# Patient Record
Sex: FEMALE | Race: BLACK OR AFRICAN AMERICAN | Employment: UNEMPLOYED | URBAN - METROPOLITAN AREA
[De-identification: names, ages, dates, MRNs, and addresses within clinical notes are randomized per-mention and may not be internally consistent; named-entity substitution may affect disease eponyms.]

---

## 2018-11-20 ENCOUNTER — HOSPITAL ENCOUNTER (EMERGENCY)
Age: 28
Discharge: HOME | End: 2018-11-20
Attending: EMERGENCY MEDICINE
Payer: COMMERCIAL

## 2018-11-20 VITALS
BODY MASS INDEX: 51.53 KG/M2 | HEART RATE: 84 BPM | SYSTOLIC BLOOD PRESSURE: 118 MMHG | WEIGHT: 280 LBS | TEMPERATURE: 98.2 F | OXYGEN SATURATION: 99 % | DIASTOLIC BLOOD PRESSURE: 68 MMHG | RESPIRATION RATE: 18 BRPM | HEIGHT: 62 IN

## 2018-11-20 DIAGNOSIS — J06.9 VIRAL UPPER RESPIRATORY TRACT INFECTION: ICD-10-CM

## 2018-11-20 DIAGNOSIS — N76.0 BV (BACTERIAL VAGINOSIS): Primary | ICD-10-CM

## 2018-11-20 DIAGNOSIS — B96.89 BV (BACTERIAL VAGINOSIS): Primary | ICD-10-CM

## 2018-11-20 LAB
SERVICE CMNT-IMP: NORMAL
WET PREP GENITAL: NORMAL

## 2018-11-20 PROCEDURE — 96372 THER/PROPH/DIAG INJ SC/IM: CPT | Performed by: PHYSICIAN ASSISTANT

## 2018-11-20 PROCEDURE — 99283 EMERGENCY DEPT VISIT LOW MDM: CPT | Performed by: PHYSICIAN ASSISTANT

## 2018-11-20 PROCEDURE — 74011250636 HC RX REV CODE- 250/636: Performed by: PHYSICIAN ASSISTANT

## 2018-11-20 PROCEDURE — 87491 CHLMYD TRACH DNA AMP PROBE: CPT

## 2018-11-20 PROCEDURE — 74011250637 HC RX REV CODE- 250/637: Performed by: PHYSICIAN ASSISTANT

## 2018-11-20 PROCEDURE — 87210 SMEAR WET MOUNT SALINE/INK: CPT

## 2018-11-20 RX ORDER — METRONIDAZOLE 500 MG/1
2000 TABLET ORAL
Qty: 4 TAB | Refills: 0 | Status: SHIPPED | OUTPATIENT
Start: 2018-11-20 | End: 2018-11-20

## 2018-11-20 RX ORDER — ONDANSETRON 8 MG/1
8 TABLET, ORALLY DISINTEGRATING ORAL
Status: COMPLETED | OUTPATIENT
Start: 2018-11-20 | End: 2018-11-20

## 2018-11-20 RX ORDER — AZITHROMYCIN 250 MG/1
1000 TABLET, FILM COATED ORAL
Status: COMPLETED | OUTPATIENT
Start: 2018-11-20 | End: 2018-11-20

## 2018-11-20 RX ADMIN — ONDANSETRON 8 MG: 8 TABLET, ORALLY DISINTEGRATING ORAL at 12:31

## 2018-11-20 RX ADMIN — LIDOCAINE HYDROCHLORIDE 250 MG: 10 INJECTION, SOLUTION INFILTRATION; PERINEURAL at 12:31

## 2018-11-20 RX ADMIN — AZITHROMYCIN 1000 MG: 250 TABLET, FILM COATED ORAL at 12:31

## 2018-11-20 NOTE — ED TRIAGE NOTES
Reports congestion and runny nose. States white vaginal discharge and irritation in vaginal area. History of BV.

## 2018-11-20 NOTE — ED NOTES
I have reviewed discharge instructions with the patient. The patient verbalized understanding. Patient left ED via Discharge Method: ambulatory to Home with self. Opportunity for questions and clarification provided. Patient given 1 scripts. To continue your aftercare when you leave the hospital, you may receive an automated call from our care team to check in on how you are doing. This is a free service and part of our promise to provide the best care and service to meet your aftercare needs.  If you have questions, or wish to unsubscribe from this service please call [phone]. Thank you for Choosing our [facility] Emergency Department.

## 2018-11-20 NOTE — ED PROVIDER NOTES
The history is provided by the patient. Nasal Congestion This is a new problem. The current episode started more than 2 days ago. The problem occurs constantly. The problem has not changed since onset. Pertinent negatives include no chest pain and no headaches. Nothing aggravates the symptoms. Nothing relieves the symptoms. Treatments tried: OTC MEDS. The treatment provided mild relief. Vaginal Discharge This is a new problem. The current episode started more than 2 days ago. The problem occurs constantly. The problem has not changed since onset. The discharge occurs spontaneously. The discharge was malodorous and yellow. She is not pregnant. She has not missed her period. Pertinent negatives include no anorexia, no fever, no constipation, no nausea and no dysuria. Risk factors include history of STDs. She has tried nothing for the symptoms. The treatment provided no relief. Her past medical history is significant for STD. History reviewed. No pertinent past medical history. Past Surgical History:  
Procedure Laterality Date  HX  SECTION    
 3 or more History reviewed. No pertinent family history. Social History Socioeconomic History  Marital status: SINGLE Spouse name: Not on file  Number of children: Not on file  Years of education: Not on file  Highest education level: Not on file Social Needs  Financial resource strain: Not on file  Food insecurity - worry: Not on file  Food insecurity - inability: Not on file  Transportation needs - medical: Not on file  Transportation needs - non-medical: Not on file Occupational History  Not on file Tobacco Use  Smoking status: Never Smoker Substance and Sexual Activity  Alcohol use: No  
 Drug use: No  
 Sexual activity: Not on file Other Topics Concern  Not on file Social History Narrative  Not on file ALLERGIES: Patient has no known allergies. Review of Systems Constitutional: Negative for fever. Cardiovascular: Negative for chest pain. Gastrointestinal: Negative for anorexia, constipation and nausea. Genitourinary: Positive for vaginal discharge. Negative for dysuria. Neurological: Negative for headaches. All other systems reviewed and are negative. Vitals:  
 11/20/18 1152 BP: 124/74 Pulse: 88 Resp: 18 Temp: 98.5 °F (36.9 °C) SpO2: 100% Weight: 127 kg (280 lb) Height: 5' 2\" (1.575 m) Physical Exam  
Constitutional: She is oriented to person, place, and time. She appears well-developed and well-nourished. No distress. OBESE HENT:  
Head: Normocephalic and atraumatic. NASAL CONGESTION Eyes: EOM are normal. Pupils are equal, round, and reactive to light. Neck: Normal range of motion. Neck supple. Cardiovascular: Normal rate and regular rhythm. Pulmonary/Chest: Effort normal and breath sounds normal.  
Abdominal: Soft. Bowel sounds are normal. She exhibits no mass. There is no rebound and no guarding. Genitourinary: Vaginal discharge found. Genitourinary Comments: MODERATE YELLOW DISCHARGE, NO LESIONS, NO MASSES Musculoskeletal: Normal range of motion. Neurological: She is alert and oriented to person, place, and time. Skin: Skin is warm. She is not diaphoretic. Psychiatric: She has a normal mood and affect. Nursing note and vitals reviewed. MDM Number of Diagnoses or Management Options Diagnosis management comments: WET PREP - 
STD TREATMENT GIVEN IN ER 
PT TO USE OVER THE COUNTER COLD MEDS AS NEEDED Amount and/or Complexity of Data Reviewed Clinical lab tests: ordered and reviewed Review and summarize past medical records: yes Risk of Complications, Morbidity, and/or Mortality Presenting problems: low Diagnostic procedures: low Management options: low Patient Progress Patient progress: improved Procedures

## 2018-11-20 NOTE — LETTER
[facility] [facility] EMERGENCY DEPT [address] [address] 
[phone] Work/School Note Date: 11/20/2018 To Whom It May concern: 
 
Christy was seen and treated today in the emergency room by the following provider(s): 
Attending Provider: Asad Soto MD 
Physician Assistant: VIPIN Glez. Christy may return to work on 11-21-18. Sincerely, VIPIN Soler

## 2018-11-21 LAB
C TRACH RRNA SPEC QL NAA+PROBE: NEGATIVE
N GONORRHOEA RRNA SPEC QL NAA+PROBE: NEGATIVE
SPECIMEN SOURCE: NORMAL

## 2023-01-19 ENCOUNTER — APPOINTMENT (OUTPATIENT)
Dept: CT IMAGING | Age: 33
End: 2023-01-19
Payer: COMMERCIAL

## 2023-01-19 ENCOUNTER — HOSPITAL ENCOUNTER (EMERGENCY)
Age: 33
Discharge: HOME | End: 2023-01-19
Attending: EMERGENCY MEDICINE
Payer: COMMERCIAL

## 2023-01-19 VITALS
HEIGHT: 62 IN | TEMPERATURE: 98.9 F | DIASTOLIC BLOOD PRESSURE: 85 MMHG | HEART RATE: 92 BPM | OXYGEN SATURATION: 99 % | WEIGHT: 293 LBS | RESPIRATION RATE: 20 BRPM | SYSTOLIC BLOOD PRESSURE: 145 MMHG | BODY MASS INDEX: 53.92 KG/M2

## 2023-01-19 DIAGNOSIS — N30.01 ACUTE CYSTITIS WITH HEMATURIA: ICD-10-CM

## 2023-01-19 DIAGNOSIS — R10.30 LOWER ABDOMINAL PAIN: Primary | ICD-10-CM

## 2023-01-19 LAB
ALBUMIN SERPL-MCNC: 3.9 G/DL (ref 3.5–5)
ALBUMIN/GLOB SERPL: 1 (ref 0.4–1.6)
ALP SERPL-CCNC: 42 U/L (ref 45–117)
ALT SERPL-CCNC: 7 U/L (ref 13–61)
ANION GAP SERPL CALC-SCNC: 12 MMOL/L (ref 2–11)
APPEARANCE UR: CLEAR
AST SERPL-CCNC: 9 U/L (ref 15–37)
BACTERIA URNS QL MICRO: ABNORMAL /HPF
BASOPHILS # BLD: 0 K/UL (ref 0–0.2)
BASOPHILS NFR BLD: 0 % (ref 0–2)
BILIRUB SERPL-MCNC: 0.4 MG/DL (ref 0.2–1.1)
BILIRUB UR QL: NEGATIVE
BUN SERPL-MCNC: 11 MG/DL (ref 7–18)
CALCIUM SERPL-MCNC: 9.1 MG/DL (ref 8.3–10.4)
CASTS URNS QL MICRO: 0 /LPF
CHLORIDE SERPL-SCNC: 100 MMOL/L (ref 98–107)
CO2 SERPL-SCNC: 27 MMOL/L (ref 21–32)
COLOR UR: YELLOW
CREAT SERPL-MCNC: 0.88 MG/DL (ref 0.6–1)
CRYSTALS URNS QL MICRO: 0 /LPF
DIFFERENTIAL METHOD BLD: ABNORMAL
EOSINOPHIL # BLD: 0.1 K/UL (ref 0–0.8)
EOSINOPHIL NFR BLD: 0 % (ref 0.5–7.8)
EPI CELLS #/AREA URNS HPF: ABNORMAL /HPF
ERYTHROCYTE [DISTWIDTH] IN BLOOD BY AUTOMATED COUNT: 15.4 % (ref 11.9–14.6)
GLOBULIN SER CALC-MCNC: 3.9 G/DL (ref 2.8–4.5)
GLUCOSE SERPL-MCNC: 101 MG/DL (ref 65–100)
GLUCOSE UR STRIP.AUTO-MCNC: NEGATIVE MG/DL
HCG UR QL: NEGATIVE
HCT VFR BLD AUTO: 33.7 % (ref 35.8–46.3)
HGB BLD-MCNC: 10.2 G/DL (ref 11.7–15.4)
HGB UR QL STRIP: ABNORMAL
IMM GRANULOCYTES # BLD AUTO: 0 K/UL (ref 0–0.5)
IMM GRANULOCYTES NFR BLD AUTO: 0 % (ref 0–5)
KETONES UR QL STRIP.AUTO: NEGATIVE MG/DL
LEUKOCYTE ESTERASE UR QL STRIP.AUTO: ABNORMAL
LIPASE SERPL-CCNC: 12 U/L (ref 13–60)
LYMPHOCYTES # BLD: 2.7 K/UL (ref 0.5–4.6)
LYMPHOCYTES NFR BLD: 23 % (ref 13–44)
MAGNESIUM SERPL-MCNC: 1.8 MG/DL (ref 1.2–2.6)
MCH RBC QN AUTO: 26.4 PG (ref 26.1–32.9)
MCHC RBC AUTO-ENTMCNC: 30.3 G/DL (ref 31.4–35)
MCV RBC AUTO: 87.1 FL (ref 82–102)
MONOCYTES # BLD: 0.6 K/UL (ref 0.1–1.3)
MONOCYTES NFR BLD: 5 % (ref 4–12)
MUCOUS THREADS URNS QL MICRO: 0 /LPF
NEUTS SEG # BLD: 8.2 K/UL (ref 1.7–8.2)
NEUTS SEG NFR BLD: 71 % (ref 43–78)
NITRITE UR QL STRIP.AUTO: NEGATIVE
NRBC # BLD: 0 K/UL (ref 0–0.2)
OTHER OBSERVATIONS: ABNORMAL
PH UR STRIP: 7 (ref 5–9)
PLATELET # BLD AUTO: 288 K/UL (ref 150–450)
PMV BLD AUTO: 9.7 FL (ref 9.4–12.3)
POTASSIUM SERPL-SCNC: 3.8 MMOL/L (ref 3.5–5.1)
PROT SERPL-MCNC: 7.8 G/DL (ref 6.4–8.2)
PROT UR STRIP-MCNC: ABNORMAL MG/DL
RBC # BLD AUTO: 3.87 M/UL (ref 4.05–5.2)
RBC #/AREA URNS HPF: ABNORMAL /HPF
SODIUM SERPL-SCNC: 139 MMOL/L (ref 133–143)
SP GR UR REFRACTOMETRY: 1.02 (ref 1–1.02)
UROBILINOGEN UR QL STRIP.AUTO: 4 EU/DL (ref 0.2–1)
WBC # BLD AUTO: 11.5 K/UL (ref 4.3–11.1)
WBC URNS QL MICRO: ABNORMAL /HPF

## 2023-01-19 PROCEDURE — 6360000002 HC RX W HCPCS: Performed by: NURSE PRACTITIONER

## 2023-01-19 PROCEDURE — 96361 HYDRATE IV INFUSION ADD-ON: CPT

## 2023-01-19 PROCEDURE — 2580000003 HC RX 258: Performed by: NURSE PRACTITIONER

## 2023-01-19 PROCEDURE — 80053 COMPREHEN METABOLIC PANEL: CPT

## 2023-01-19 PROCEDURE — 81025 URINE PREGNANCY TEST: CPT

## 2023-01-19 PROCEDURE — 83690 ASSAY OF LIPASE: CPT

## 2023-01-19 PROCEDURE — 96375 TX/PRO/DX INJ NEW DRUG ADDON: CPT

## 2023-01-19 PROCEDURE — 87086 URINE CULTURE/COLONY COUNT: CPT

## 2023-01-19 PROCEDURE — 99285 EMERGENCY DEPT VISIT HI MDM: CPT

## 2023-01-19 PROCEDURE — 74177 CT ABD & PELVIS W/CONTRAST: CPT

## 2023-01-19 PROCEDURE — 85025 COMPLETE CBC W/AUTO DIFF WBC: CPT

## 2023-01-19 PROCEDURE — 83735 ASSAY OF MAGNESIUM: CPT

## 2023-01-19 PROCEDURE — 6360000004 HC RX CONTRAST MEDICATION: Performed by: NURSE PRACTITIONER

## 2023-01-19 PROCEDURE — 81001 URINALYSIS AUTO W/SCOPE: CPT

## 2023-01-19 PROCEDURE — 87186 SC STD MICRODIL/AGAR DIL: CPT

## 2023-01-19 PROCEDURE — 96365 THER/PROPH/DIAG IV INF INIT: CPT

## 2023-01-19 RX ORDER — SODIUM CHLORIDE 0.9 % (FLUSH) 0.9 %
5-40 SYRINGE (ML) INJECTION 2 TIMES DAILY
Status: DISCONTINUED | OUTPATIENT
Start: 2023-01-19 | End: 2023-01-19 | Stop reason: HOSPADM

## 2023-01-19 RX ORDER — 0.9 % SODIUM CHLORIDE 0.9 %
1000 INTRAVENOUS SOLUTION INTRAVENOUS ONCE
Status: COMPLETED | OUTPATIENT
Start: 2023-01-19 | End: 2023-01-19

## 2023-01-19 RX ORDER — MORPHINE SULFATE 4 MG/ML
4 INJECTION INTRAVENOUS
Status: COMPLETED | OUTPATIENT
Start: 2023-01-19 | End: 2023-01-19

## 2023-01-19 RX ORDER — 0.9 % SODIUM CHLORIDE 0.9 %
100 INTRAVENOUS SOLUTION INTRAVENOUS ONCE
Status: COMPLETED | OUTPATIENT
Start: 2023-01-19 | End: 2023-01-19

## 2023-01-19 RX ORDER — CEPHALEXIN 500 MG/1
500 CAPSULE ORAL 2 TIMES DAILY
Qty: 14 CAPSULE | Refills: 0 | Status: SHIPPED | OUTPATIENT
Start: 2023-01-19 | End: 2023-01-26

## 2023-01-19 RX ORDER — ONDANSETRON 2 MG/ML
4 INJECTION INTRAMUSCULAR; INTRAVENOUS ONCE
Status: COMPLETED | OUTPATIENT
Start: 2023-01-19 | End: 2023-01-19

## 2023-01-19 RX ADMIN — SODIUM CHLORIDE, PRESERVATIVE FREE 10 ML: 5 INJECTION INTRAVENOUS at 19:10

## 2023-01-19 RX ADMIN — SODIUM CHLORIDE 100 ML: 9 INJECTION, SOLUTION INTRAVENOUS at 19:10

## 2023-01-19 RX ADMIN — CEFTRIAXONE 1000 MG: 1 INJECTION, POWDER, FOR SOLUTION INTRAMUSCULAR; INTRAVENOUS at 20:28

## 2023-01-19 RX ADMIN — ONDANSETRON 4 MG: 2 INJECTION INTRAMUSCULAR; INTRAVENOUS at 18:32

## 2023-01-19 RX ADMIN — IOPAMIDOL 100 ML: 755 INJECTION, SOLUTION INTRAVENOUS at 19:10

## 2023-01-19 RX ADMIN — MORPHINE SULFATE 4 MG: 4 INJECTION INTRAVENOUS at 18:32

## 2023-01-19 RX ADMIN — SODIUM CHLORIDE 1000 ML: 9 INJECTION, SOLUTION INTRAVENOUS at 18:32

## 2023-01-19 ASSESSMENT — LIFESTYLE VARIABLES
HOW OFTEN DO YOU HAVE A DRINK CONTAINING ALCOHOL: 2-3 TIMES A WEEK
HOW MANY STANDARD DRINKS CONTAINING ALCOHOL DO YOU HAVE ON A TYPICAL DAY: 5 OR 6

## 2023-01-19 ASSESSMENT — PAIN SCALES - GENERAL
PAINLEVEL_OUTOF10: 10
PAINLEVEL_OUTOF10: 6
PAINLEVEL_OUTOF10: 10

## 2023-01-19 ASSESSMENT — PAIN - FUNCTIONAL ASSESSMENT: PAIN_FUNCTIONAL_ASSESSMENT: 0-10

## 2023-01-19 NOTE — ED PROVIDER NOTES
Emergency Department Provider Note                   PCP:                None Provider               Age: 28 y.o. Sex: female       ICD-10-CM    1. Lower abdominal pain  R10.30       2. Acute cystitis with hematuria  N30.01           DISPOSITION    Discharged    Medical Decision Making  Amount and/or Complexity of Data Reviewed  Labs: ordered. Radiology: ordered. Risk  Prescription drug management. Patient is pleasant and very well-appearing. She is afebrile and she is hemodynamically stable. She is endorsing lower abdominal pain and tenderness, denying all other complaint. Not on therapeutic measures. Reassuring exam, skin normal color temperature, no rash or lesion. Normoactive bowel sounds. She has bilateral lower abdominal tenderness. She has no adnexal tenderness. She denies concern for STI. Denies vaginal bleeding or abnormal discharge. Does not suspect she is pregnant, status post tubal ligation. No back or CVA tenderness. No fevers or chills. Patient is quite well-appearing, appears in no acute distress. Have low clinical suspicion of sepsis at this time. Will obtain labs, UA, urine hCG and CT scan of the abdomen. Patient much improved with IV fluids, Zofran and pain medication. Denying current complaint. CT scan of the abdomen returned with findings consistent with cystitis. UA returned with moderate blood, bacteria and white cells. Was given IV antibiotics in the ED. Did not give aggressive fluid rehydration due to concern for potential fluid volume overload. With no new or worsening symptoms, feel patient stable for discharge home. Will prescribe antibiotic for UTI. Advised to follow-up with PCP in 1-2 days for reevaluation and further management. Form dated on 3 watch level given strict return precautions. Patient is well-hydrated appearing, no distress. Nontoxic-appearing, tolerating oral intake, hemodynamically stable.  All findings and plan were discussed with the patient. All questions answered. Discussed with the patient that an unremarkable evaluation in the ED does not preclude the development or presence of a serious or life threatening condition. Patient was instructed to return immediately for any worsening or change in current symptoms, or if symptoms do not continue to improve. I instructed them to follow up with their primary care provider, own specialist, or medical provider that I am recommending for him within the next 2-3 days  The patient acknowledged understanding plan of care and affirmed approval.     Signed by: HUMBERTO Ledesma     This note created using Dragon voice recognition software. Please excuse any accidental errors associated with its use, as note has not been fully proofread and edited. Complexity of Problem: 1 undiagnosed new problem with uncertain prognosis. (4)      I have reviewed records from an external source: ED records from outside this hospital.  Considerations: Shared decision making was utilized in the care of this patient. Patient was discharged risks and benefits of hospitalization were discussed. Orders Placed This Encounter   Procedures    CT ABDOMEN PELVIS W IV CONTRAST Additional Contrast? None    Urinalysis    Urine Preg (Lab)    CMP    CBC with Auto Differential    Magnesium    Lipase    Saline lock IV        Medications   0.9 % sodium chloride bolus (has no administration in time range)   morphine injection 4 mg (has no administration in time range)   ondansetron (ZOFRAN) injection 4 mg (has no administration in time range)       New Prescriptions    No medications on file        Giuseppe Sender is a 28 y.o. female who presents to the Emergency Department with chief complaint of    Chief Complaint   Patient presents with    Abdominal Pain      HPI  70-year-old female presents to the ED with complaint of bilateral lower abdominal pain x3 days.   Patient states that pain is constant and made worse with movement. States last bowel movement just prior to arrival, describes as formed and brown. Denies vaginal bleeding, abnormal vaginal discharge, pelvic pain. Denies urinary symptoms, nausea or vomiting, fevers or chills. Denies recent illness, injury, surgery. Reports surgical history of  x3 and tubal ligation. Unknown last menstrual period. Patient states she is worried that her tubal ligation \"did not work. \"  No known therapeutic measures attempted prior to arrival.  Nothing else known to make worse or better. No recent URI symptoms. Review of Systems  Constitutional: As in HPI  HENT: Negative     Eyes: Negative   Respiratory: Negative  Cardiovascular: Negative  GI/: As in HPI  Musculoskeletal: Negative   Skin: Negative     Allergic/Immunologic: Negative  Neurological: Negative                        No past medical history on file. No past surgical history on file. No family history on file. Social History     Socioeconomic History    Marital status: Single        Allergies: Patient has no known allergies. Previous Medications    No medications on file        Vitals signs and nursing note reviewed. Patient Vitals for the past 4 hrs:   Temp Pulse Resp BP SpO2   23 1726 99.1 °F (37.3 °C) (!) 101 19 (!) 148/90 100 %          Physical Exam   Constitutional: Oriented to person, place, and time. Appears well-developed and well-nourished. No distress. HENT:    Head: Normocephalic and atraumatic   Right Ear: External ear normal.    Left Ear: External ear normal.     Nose: Nose normal.   Mouth/Throat: Mouth normal.    Eyes: Conjunctivae are normal.   Neck: Supple. No tracheal deviation. Cardiovascular: Normal rate, intact distal pulses. Brisk capillary refill intact, less than 2 seconds. Regular rhythm present. No pitting edema. Pulmonary/Chest: Lungs are clear & equal bilaterally. No adventitious sounds. No respiratory distress. Abdominal: Soft.  There is a bilateral lower abdominal tenderness with normoactive bowel sounds, no rebound or rigidity. No CVA tenderness, no flank tenderness, negative Govea sign. Musculoskeletal: No obvious deformity, erythema, edema. Neurological: Alert and oriented to person, place, and time. No numbness/tingling. No loss of sensation. Positive PMS ×4. GCS= 15. Skin: Skin is warm and dry. Capillary refill takes less than 2 seconds. No abrasion, no lesion, no petechiae and no rash noted. Not diaphoretic. No cyanosis, erythema, or pallor. Psychiatric: Normal mood and affect. Behavior is normal.    Nursing note and vitals reviewed. Procedures    Is this patient to be included in the SEP-1 core measure due to severe sepsis or septic shock? No Exclusion criteria - the patient is NOT to be included for SEP-1 Core Measure due to: May have criteria for sepsis, but does not meet criteria for severe sepsis or septic shock     Results for orders placed or performed during the hospital encounter of 01/19/23   CT ABDOMEN PELVIS W IV CONTRAST Additional Contrast? None    Narrative    CT ABDOMEN AND PELVIS WITH INTRAVENOUS CONTRAST DATED 1/19/2023 7:46 PM.    History: Multiple episodes of sharp abdominal pain. Comparison:    Technique:   Multiple contiguous helical CT images reconstructed at 5 mm  intervals were obtained from above the diaphragms through the ischial  tuberosities following 100 cc Isovue-370 without acute complication. All CT  scans performed at this facility use one or all of the following: Automated  exposure control, adjustment of the mA and/or kVp according to patient's size,  iterative reconstruction. Findings:  CT ABDOMEN:    Limited evaluation of the lung bases and base of the mediastinum demonstrates no  significant abnormalities. The Liver is homogeneous in attenuation. The spleen is homogeneous in  attenuation.   No contour deforming or enhancing mass lesions are seen of the  pancreas or adrenal glands. The gallbladder has an unremarkable CT appearance  without radiopaque stones or pericholecystic fluid/inflammatory changes. The  kidneys enhance symmetrically and no evidence of hydronephrosis is seen. The visualized loops of small bowel and colon are normal in caliber. The  appendix is not clearly seen limiting assessment although it can be said that no  acute inflammatory structure at the cecal tip is seen which is felt to occur on  approximately axial image 65. No free fluid, free air, or focal inflammatory  changes are seen in the abdomen. No adenopathy is seen. The abdominal aorta is  unremarkable in appearance. Moderate anasarca is seen. CT PELVIS:  No abnormal pelvic fluid collections or inflammatory changes are present. No  pelvic adenopathy is seen. The urinary bladder appears thick-walled and  inflamed. Impression    1. Thick-walled and inflamed urinary bladder suggesting UTI. 2. Moderate anasarca.   Although this could be related to fluid overload,  signs/symptoms of cellulitis should be excluded given the patient's acute pain     Urinalysis   Result Value Ref Range    Color, UA YELLOW      Appearance CLEAR      Specific Gravity, UA 1.020 1.001 - 1.023      pH, Urine 7.0 5.0 - 9.0      Protein, UA TRACE (A) NEG mg/dL    Glucose, UA Negative mg/dL    Ketones, Urine Negative NEG mg/dL    Bilirubin Urine Negative NEG      Blood, Urine MODERATE (A) NEG      Urobilinogen, Urine 4.0 (H) 0.2 - 1.0 EU/dL    Nitrite, Urine Negative NEG      Leukocyte Esterase, Urine SMALL (A) NEG     Urine Preg (Lab)   Result Value Ref Range    HCG(Urine) Pregnancy Test Negative     CMP   Result Value Ref Range    Sodium 139 133 - 143 mmol/L    Potassium 3.8 3.5 - 5.1 mmol/L    Chloride 100 98 - 107 mmol/L    CO2 27 21 - 32 mmol/L    Anion Gap 12 (H) 2 - 11 mmol/L    Glucose 101 (H) 65 - 100 mg/dL    BUN 11 7.0 - 18.0 MG/DL    Creatinine 0.88 0.6 - 1.0 MG/DL    Est, Glom Filt Rate >60 >60 ml/min/1.73m2    Calcium 9.1 8.3 - 10.4 MG/DL    Total Bilirubin 0.4 0.2 - 1.1 MG/DL    ALT 7 (L) 13.0 - 61.0 U/L    AST 9 (L) 15 - 37 U/L    Alk Phosphatase 42 (L) 45.0 - 117.0 U/L    Total Protein 7.8 6.4 - 8.2 g/dL    Albumin 3.9 3.5 - 5.0 g/dL    Globulin 3.9 2.8 - 4.5 g/dL    Albumin/Globulin Ratio 1.0 0.4 - 1.6     CBC with Auto Differential   Result Value Ref Range    WBC 11.5 (H) 4.3 - 11.1 K/uL    RBC 3.87 (L) 4.05 - 5.20 M/uL    Hemoglobin 10.2 (L) 11.7 - 15.4 g/dL    Hematocrit 33.7 (L) 35.8 - 46.3 %    MCV 87.1 82.0 - 102.0 FL    MCH 26.4 26.1 - 32.9 PG    MCHC 30.3 (L) 31.4 - 35.0 g/dL    RDW 15.4 (H) 11.9 - 14.6 %    Platelets 021 974 - 262 K/uL    MPV 9.7 9.4 - 12.3 FL    nRBC 0.00 0.0 - 0.2 K/uL    Differential Type AUTOMATED      Seg Neutrophils 71 43 - 78 %    Lymphocytes 23 13 - 44 %    Monocytes 5 4.0 - 12.0 %    Eosinophils % 0 (L) 0.5 - 7.8 %    Basophils 0 0.0 - 2.0 %    Immature Granulocytes 0 0.0 - 5.0 %    Segs Absolute 8.2 1.7 - 8.2 K/UL    Absolute Lymph # 2.7 0.5 - 4.6 K/UL    Absolute Mono # 0.6 0.1 - 1.3 K/UL    Absolute Eos # 0.1 0.0 - 0.8 K/UL    Basophils Absolute 0.0 0.0 - 0.2 K/UL    Absolute Immature Granulocyte 0.0 0.0 - 0.5 K/UL   Magnesium   Result Value Ref Range    Magnesium 1.8 1.2 - 2.6 mg/dL   Lipase   Result Value Ref Range    Lipase 12 (L) 13 - 60 U/L   Urinalysis, Micro   Result Value Ref Range    WBC, UA  0 /hpf    RBC, UA 5-10 0 /hpf    Epithelial Cells UA 3-5 0 /hpf    BACTERIA, URINE 3+ (H) 0 /hpf    Casts 0 0 /lpf    Crystals 0 0 /LPF    Mucus, UA 0 0 /lpf    Other observations RESULTS VERIFIED MANUALLY          CT ABDOMEN PELVIS W IV CONTRAST Additional Contrast? None   Final Result   1. Thick-walled and inflamed urinary bladder suggesting UTI. 2. Moderate anasarca.   Although this could be related to fluid overload,   signs/symptoms of cellulitis should be excluded given the patient's acute pain                               Voice dictation software was used during the making of this note. This software is not perfect and grammatical and other typographical errors may be present. This note has not been completely proofread for errors.      OK Guevara - LANA  01/19/23 2042

## 2023-01-22 LAB
BACTERIA SPEC CULT: ABNORMAL
BACTERIA SPEC CULT: ABNORMAL
SERVICE CMNT-IMP: ABNORMAL

## 2023-07-26 ENCOUNTER — HOSPITAL ENCOUNTER (OUTPATIENT)
Dept: SLEEP CENTER | Age: 33
Discharge: HOME | End: 2023-07-29

## 2023-08-17 ENCOUNTER — TELEPHONE (OUTPATIENT)
Dept: SLEEP MEDICINE | Age: 33
End: 2023-08-17

## 2023-08-23 ASSESSMENT — SLEEP AND FATIGUE QUESTIONNAIRES
ESS TOTAL SCORE: 11
HOW LIKELY ARE YOU TO NOD OFF OR FALL ASLEEP WHEN YOU ARE A PASSENGER IN A CAR FOR AN HOUR WITHOUT A BREAK: 2
HOW LIKELY ARE YOU TO NOD OFF OR FALL ASLEEP WHILE SITTING QUIETLY AFTER LUNCH WITHOUT ALCOHOL: 2
HOW LIKELY ARE YOU TO NOD OFF OR FALL ASLEEP WHILE SITTING AND TALKING TO SOMEONE: 0
HOW LIKELY ARE YOU TO NOD OFF OR FALL ASLEEP WHILE SITTING INACTIVE IN A PUBLIC PLACE: 1
HOW LIKELY ARE YOU TO NOD OFF OR FALL ASLEEP WHILE LYING DOWN TO REST IN THE AFTERNOON WHEN CIRCUMSTANCES PERMIT: 2
HOW LIKELY ARE YOU TO NOD OFF OR FALL ASLEEP WHILE SITTING AND READING: 1
HOW LIKELY ARE YOU TO NOD OFF OR FALL ASLEEP WHILE WATCHING TV: 3
HOW LIKELY ARE YOU TO NOD OFF OR FALL ASLEEP IN A CAR, WHILE STOPPED FOR A FEW MINUTES IN TRAFFIC: 0

## 2023-08-23 NOTE — PROGRESS NOTES
Orders Placed This Encounter   Medications    melatonin 5 MG TABS tablet     Sig: Take 1 tablet by mouth nightly     Dispense:  30 tablet     Refill:  5          Collaborating Physician: Dr. Shai Gómez office visit was spent  reviewing test results, prognosis, importance of compliance, education about disease process, benefits of medications, instructions for management of acute flare-ups, and follow up plans.       Trinna Simmonds, APRN - CNP  Electronically signed

## 2023-08-24 ENCOUNTER — OFFICE VISIT (OUTPATIENT)
Dept: SLEEP MEDICINE | Age: 33
End: 2023-08-24
Payer: COMMERCIAL

## 2023-08-24 VITALS
HEIGHT: 63 IN | BODY MASS INDEX: 51.91 KG/M2 | HEART RATE: 82 BPM | DIASTOLIC BLOOD PRESSURE: 88 MMHG | WEIGHT: 293 LBS | SYSTOLIC BLOOD PRESSURE: 132 MMHG | TEMPERATURE: 97.2 F | RESPIRATION RATE: 17 BRPM | OXYGEN SATURATION: 95 %

## 2023-08-24 DIAGNOSIS — G47.34 NOCTURNAL HYPOXEMIA: ICD-10-CM

## 2023-08-24 DIAGNOSIS — G47.10 HYPERSOMNIA: ICD-10-CM

## 2023-08-24 DIAGNOSIS — Z72.821 POOR SLEEP HYGIENE: ICD-10-CM

## 2023-08-24 DIAGNOSIS — G47.33 OSA (OBSTRUCTIVE SLEEP APNEA): Primary | ICD-10-CM

## 2023-08-24 DIAGNOSIS — G47.00 PERSISTENT DISORDER OF INITIATING OR MAINTAINING SLEEP: ICD-10-CM

## 2023-08-24 DIAGNOSIS — G47.61 PLMD (PERIODIC LIMB MOVEMENT DISORDER): ICD-10-CM

## 2023-08-24 PROCEDURE — 99203 OFFICE O/P NEW LOW 30 MIN: CPT | Performed by: STUDENT IN AN ORGANIZED HEALTH CARE EDUCATION/TRAINING PROGRAM

## 2023-08-24 RX ORDER — CHOLECALCIFEROL (VITAMIN D3) 125 MCG
5 CAPSULE ORAL NIGHTLY
Qty: 30 TABLET | Refills: 5 | Status: SHIPPED | OUTPATIENT
Start: 2023-08-24

## 2023-08-24 ASSESSMENT — SLEEP AND FATIGUE QUESTIONNAIRES
HOW LIKELY ARE YOU TO NOD OFF OR FALL ASLEEP WHILE LYING DOWN TO REST IN THE AFTERNOON WHEN CIRCUMSTANCES PERMIT: 3
HOW LIKELY ARE YOU TO NOD OFF OR FALL ASLEEP WHILE SITTING QUIETLY AFTER LUNCH WITHOUT ALCOHOL: 2
HOW LIKELY ARE YOU TO NOD OFF OR FALL ASLEEP WHILE SITTING INACTIVE IN A PUBLIC PLACE: 0
ESS TOTAL SCORE: 11
HOW LIKELY ARE YOU TO NOD OFF OR FALL ASLEEP WHILE WATCHING TV: 2
HOW LIKELY ARE YOU TO NOD OFF OR FALL ASLEEP WHEN YOU ARE A PASSENGER IN A CAR FOR AN HOUR WITHOUT A BREAK: 0
HOW LIKELY ARE YOU TO NOD OFF OR FALL ASLEEP WHILE SITTING AND READING: 2
HOW LIKELY ARE YOU TO NOD OFF OR FALL ASLEEP WHILE SITTING AND TALKING TO SOMEONE: 2
HOW LIKELY ARE YOU TO NOD OFF OR FALL ASLEEP IN A CAR, WHILE STOPPED FOR A FEW MINUTES IN TRAFFIC: 0

## 2023-08-24 NOTE — PATIENT INSTRUCTIONS
Start melatonin 5mg 1-2 hours prior to bedtime. Try to have a more consistent sleep schedule- set bed time and set wake time. Patient Education        Sleep Apnea: Care Instructions  Overview     Sleep apnea means that you frequently stop breathing for 10 seconds or longer during sleep. It can be mild to severe, based on the number of times an hour that you stop breathing. Blocked or narrowed airways in your nose, mouth, or throat can cause sleep apnea. Your airway can become blocked when your throat muscles and tongue relax during sleep. You can help treat sleep apnea at home by making lifestyle changes. You also can use a CPAP breathing machine that keeps tissues in the throat from blocking your airway. Or your doctor may suggest that you use a breathing device while you sleep. It helps keep your airway open. This could be a device that you put in your mouth. In some cases, surgery may be needed to remove enlarged tissues in the throat. Follow-up care is a key part of your treatment and safety. Be sure to make and go to all appointments, and call your doctor if you are having problems. It's also a good idea to know your test results and keep a list of the medicines you take. How can you care for yourself at home? Lose weight, if needed. Sleep on your side. It may help mild apnea. Avoid alcohol and medicines such as sleeping pills, opioids, or sedatives before bed. Don't smoke. If you need help quitting, talk to your doctor. Prop up the head of your bed. Treat breathing problems, such as a stuffy nose, that are caused by a cold or allergies. Try a continuous positive airway pressure (CPAP) breathing machine if your doctor recommends it. If CPAP doesn't work for you, ask your doctor if you can try other masks, settings, or breathing machines. Try oral breathing devices or other nasal devices.   Talk to your doctor if your nose feels dry or bleeds, or if it gets runny or stuffy when you use a breathing

## 2023-08-29 ENCOUNTER — TELEPHONE (OUTPATIENT)
Dept: SLEEP MEDICINE | Age: 33
End: 2023-08-29

## 2023-08-29 NOTE — TELEPHONE ENCOUNTER
Received notice from [facility] that Medicaid does not cover oral eligio. She was given a cash fee option but pt wants to speak with someone in sleep first about cpap.

## 2023-08-30 NOTE — TELEPHONE ENCOUNTER
Attempted to call patient however call would not go through. Will send [employer] message.     Ronnell Sweet, APRN - CNP

## 2024-08-04 ENCOUNTER — HOSPITAL ENCOUNTER (INPATIENT)
Age: 34
LOS: 3 days | Discharge: HOME | DRG: 872 | End: 2024-08-07
Attending: EMERGENCY MEDICINE | Admitting: STUDENT IN AN ORGANIZED HEALTH CARE EDUCATION/TRAINING PROGRAM
Payer: MEDICAID

## 2024-08-04 ENCOUNTER — APPOINTMENT (OUTPATIENT)
Dept: CT IMAGING | Age: 34
DRG: 872 | End: 2024-08-04
Payer: MEDICAID

## 2024-08-04 ENCOUNTER — APPOINTMENT (OUTPATIENT)
Dept: GENERAL RADIOLOGY | Age: 34
DRG: 872 | End: 2024-08-04
Payer: MEDICAID

## 2024-08-04 DIAGNOSIS — N30.01 ACUTE CYSTITIS WITH HEMATURIA: ICD-10-CM

## 2024-08-04 DIAGNOSIS — A41.9 SEPTICEMIA (HCC): Primary | ICD-10-CM

## 2024-08-04 DIAGNOSIS — N12 PYELONEPHRITIS: ICD-10-CM

## 2024-08-04 PROBLEM — E66.01 MORBID OBESITY (HCC): Status: ACTIVE | Noted: 2024-08-04

## 2024-08-04 PROBLEM — D72.829 LEUKOCYTOSIS: Status: ACTIVE | Noted: 2024-08-04

## 2024-08-04 PROBLEM — E87.20 LACTIC ACIDOSIS: Status: ACTIVE | Noted: 2024-08-04

## 2024-08-04 LAB
ACCESSION NUMBER, LLC1M: ABNORMAL
ACINETOBACTER CALCOAC BAUMANNII COMPLEX BY PCR: NOT DETECTED
ALBUMIN SERPL-MCNC: 2.8 G/DL (ref 3.5–5)
ALBUMIN/GLOB SERPL: 0.7 (ref 1–1.9)
ALP SERPL-CCNC: 47 U/L (ref 35–104)
ALT SERPL-CCNC: <5 U/L (ref 12–65)
ANION GAP SERPL CALC-SCNC: 13 MMOL/L (ref 9–18)
APPEARANCE UR: SLIGHT
AST SERPL-CCNC: 13 U/L (ref 15–37)
B FRAGILIS DNA BLD POS QL NAA+NON-PROBE: NOT DETECTED
BACTERIA URNS QL MICRO: ABNORMAL /HPF
BASOPHILS # BLD: 0 K/UL (ref 0–0.2)
BASOPHILS NFR BLD: 0 % (ref 0–2)
BILIRUB SERPL-MCNC: 0.9 MG/DL (ref 0–1.2)
BILIRUB UR QL: NEGATIVE
BIOFIRE TEST COMMENT: ABNORMAL
BLACTX-M ISLT/SPM QL: NOT DETECTED
BLAIMP ISLT/SPM QL: NOT DETECTED
BLAKPC ISLT/SPM QL: NOT DETECTED
BLAOXA-48-LIKE ISLT/SPM QL: NOT DETECTED
BLAVIM ISLT/SPM QL: NOT DETECTED
BUN SERPL-MCNC: 10 MG/DL (ref 6–23)
C ALBICANS DNA BLD POS QL NAA+NON-PROBE: NOT DETECTED
C AURIS DNA BLD POS QL NAA+NON-PROBE: NOT DETECTED
C GATTII+NEOFOR DNA BLD POS QL NAA+N-PRB: NOT DETECTED
C GLABRATA DNA BLD POS QL NAA+NON-PROBE: NOT DETECTED
C KRUSEI DNA BLD POS QL NAA+NON-PROBE: NOT DETECTED
C PARAP DNA BLD POS QL NAA+NON-PROBE: NOT DETECTED
C TROPICLS DNA BLD POS QL NAA+NON-PROBE: NOT DETECTED
CALCIUM SERPL-MCNC: 8.2 MG/DL (ref 8.8–10.2)
CASTS URNS QL MICRO: ABNORMAL /LPF
CHLORIDE SERPL-SCNC: 98 MMOL/L (ref 98–107)
CO2 SERPL-SCNC: 20 MMOL/L (ref 20–28)
COLISTIN RES MCR-1 ISLT/SPM QL: NOT DETECTED
COLOR UR: YELLOW
CREAT SERPL-MCNC: 1.19 MG/DL (ref 0.6–1.1)
CRYSTALS URNS QL MICRO: 0 /LPF
DIFFERENTIAL METHOD BLD: ABNORMAL
E CLOAC COMP DNA BLD POS NAA+NON-PROBE: NOT DETECTED
E COLI DNA BLD POS QL NAA+NON-PROBE: DETECTED
E FAECALIS DNA BLD POS QL NAA+NON-PROBE: NOT DETECTED
E FAECIUM DNA BLD POS QL NAA+NON-PROBE: NOT DETECTED
EKG ATRIAL RATE: 112 BPM
EKG DIAGNOSIS: NORMAL
EKG P AXIS: 86 DEGREES
EKG P-R INTERVAL: 130 MS
EKG Q-T INTERVAL: 318 MS
EKG QRS DURATION: 72 MS
EKG QTC CALCULATION (BAZETT): 434 MS
EKG R AXIS: 77 DEGREES
EKG T AXIS: -28 DEGREES
EKG VENTRICULAR RATE: 112 BPM
ENTEROBACTERALES DNA BLD POS NAA+N-PRB: DETECTED
EOSINOPHIL # BLD: 0 K/UL (ref 0–0.8)
EOSINOPHIL NFR BLD: 0 % (ref 0.5–7.8)
EPI CELLS #/AREA URNS HPF: ABNORMAL /HPF
ERYTHROCYTE [DISTWIDTH] IN BLOOD BY AUTOMATED COUNT: 15.9 % (ref 11.9–14.6)
FLUAV RNA SPEC QL NAA+PROBE: NOT DETECTED
FLUBV RNA SPEC QL NAA+PROBE: NOT DETECTED
GLOBULIN SER CALC-MCNC: 4.1 G/DL (ref 2.3–3.5)
GLUCOSE SERPL-MCNC: 181 MG/DL (ref 70–99)
GLUCOSE UR STRIP.AUTO-MCNC: NEGATIVE MG/DL
GP B STREP DNA BLD POS QL NAA+NON-PROBE: NOT DETECTED
HAEM INFLU DNA BLD POS QL NAA+NON-PROBE: NOT DETECTED
HCG UR QL: NEGATIVE
HCT VFR BLD AUTO: 34.1 % (ref 35.8–46.3)
HGB BLD-MCNC: 10.3 G/DL (ref 11.7–15.4)
HGB UR QL STRIP: ABNORMAL
IMM GRANULOCYTES # BLD AUTO: 0.1 K/UL (ref 0–0.5)
IMM GRANULOCYTES NFR BLD AUTO: 1 % (ref 0–5)
K OXYTOCA DNA BLD POS QL NAA+NON-PROBE: NOT DETECTED
KETONES UR QL STRIP.AUTO: NEGATIVE MG/DL
KLEBSIELLA SP DNA BLD POS QL NAA+NON-PRB: NOT DETECTED
KLEBSIELLA SP DNA BLD POS QL NAA+NON-PRB: NOT DETECTED
L MONOCYTOG DNA BLD POS QL NAA+NON-PROBE: NOT DETECTED
LACTATE SERPL-SCNC: 0.5 MMOL/L (ref 0.5–2)
LACTATE SERPL-SCNC: 2.6 MMOL/L (ref 0.5–2)
LEUKOCYTE ESTERASE UR QL STRIP.AUTO: ABNORMAL
LIPASE SERPL-CCNC: 20 U/L (ref 13–60)
LYMPHOCYTES # BLD: 0.9 K/UL (ref 0.5–4.6)
LYMPHOCYTES NFR BLD: 6 % (ref 13–44)
MCH RBC QN AUTO: 26.8 PG (ref 26.1–32.9)
MCHC RBC AUTO-ENTMCNC: 30.2 G/DL (ref 31.4–35)
MCV RBC AUTO: 88.6 FL (ref 82–102)
MONOCYTES # BLD: 0.3 K/UL (ref 0.1–1.3)
MONOCYTES NFR BLD: 2 % (ref 4–12)
MUCOUS THREADS URNS QL MICRO: 0 /LPF
N MEN DNA BLD POS QL NAA+NON-PROBE: NOT DETECTED
NEUTS SEG # BLD: 13.2 K/UL (ref 1.7–8.2)
NEUTS SEG NFR BLD: 91 % (ref 43–78)
NITRITE UR QL STRIP.AUTO: POSITIVE
NRBC # BLD: 0 K/UL (ref 0–0.2)
OTHER OBSERVATIONS: ABNORMAL
P AERUGINOSA DNA BLD POS NAA+NON-PROBE: NOT DETECTED
PH UR STRIP: 6.5 (ref 5–9)
PLATELET # BLD AUTO: 225 K/UL (ref 150–450)
PMV BLD AUTO: 10.5 FL (ref 9.4–12.3)
POTASSIUM SERPL-SCNC: 3.2 MMOL/L (ref 3.5–5.1)
PROCALCITONIN SERPL-MCNC: 3.2 NG/ML (ref 0–0.1)
PROT SERPL-MCNC: 6.9 G/DL (ref 6.3–8.2)
PROT UR STRIP-MCNC: 30 MG/DL
PROTEUS SP DNA BLD POS QL NAA+NON-PROBE: NOT DETECTED
RBC # BLD AUTO: 3.85 M/UL (ref 4.05–5.2)
RBC #/AREA URNS HPF: ABNORMAL /HPF
RESISTANT GENE NDM BY PCR: NOT DETECTED
RESISTANT GENE TARGETS: ABNORMAL
S AUREUS DNA BLD POS QL NAA+NON-PROBE: NOT DETECTED
S AUREUS+CONS DNA BLD POS NAA+NON-PROBE: NOT DETECTED
S EPIDERMIDIS DNA BLD POS QL NAA+NON-PRB: NOT DETECTED
S LUGDUNENSIS DNA BLD POS QL NAA+NON-PRB: NOT DETECTED
S MALTOPHILIA DNA BLD POS QL NAA+NON-PRB: NOT DETECTED
S MARCESCENS DNA BLD POS NAA+NON-PROBE: NOT DETECTED
S PNEUM DNA BLD POS QL NAA+NON-PROBE: NOT DETECTED
S PYO DNA BLD POS QL NAA+NON-PROBE: NOT DETECTED
SALMONELLA DNA BLD POS QL NAA+NON-PROBE: NOT DETECTED
SARS-COV-2 RDRP RESP QL NAA+PROBE: NOT DETECTED
SODIUM SERPL-SCNC: 132 MMOL/L (ref 136–145)
SOURCE: NORMAL
SP GR UR REFRACTOMETRY: 1.01 (ref 1–1.02)
STREP, MOLECULAR: NOT DETECTED
STREPTOCOCCUS DNA BLD POS NAA+NON-PROBE: NOT DETECTED
UROBILINOGEN UR QL STRIP.AUTO: 4 EU/DL (ref 0.2–1)
WBC # BLD AUTO: 14.5 K/UL (ref 4.3–11.1)
WBC URNS QL MICRO: >100 /HPF

## 2024-08-04 PROCEDURE — 87205 SMEAR GRAM STAIN: CPT

## 2024-08-04 PROCEDURE — 87186 SC STD MICRODIL/AGAR DIL: CPT

## 2024-08-04 PROCEDURE — 93005 ELECTROCARDIOGRAM TRACING: CPT | Performed by: EMERGENCY MEDICINE

## 2024-08-04 PROCEDURE — 87088 URINE BACTERIA CULTURE: CPT

## 2024-08-04 PROCEDURE — 74177 CT ABD & PELVIS W/CONTRAST: CPT

## 2024-08-04 PROCEDURE — 71046 X-RAY EXAM CHEST 2 VIEWS: CPT

## 2024-08-04 PROCEDURE — 1100000000 HC RM PRIVATE

## 2024-08-04 PROCEDURE — 6370000000 HC RX 637 (ALT 250 FOR IP): Performed by: STUDENT IN AN ORGANIZED HEALTH CARE EDUCATION/TRAINING PROGRAM

## 2024-08-04 PROCEDURE — 2580000003 HC RX 258

## 2024-08-04 PROCEDURE — 99285 EMERGENCY DEPT VISIT HI MDM: CPT

## 2024-08-04 PROCEDURE — 6360000002 HC RX W HCPCS

## 2024-08-04 PROCEDURE — 6360000004 HC RX CONTRAST MEDICATION

## 2024-08-04 PROCEDURE — 87040 BLOOD CULTURE FOR BACTERIA: CPT

## 2024-08-04 PROCEDURE — 84145 PROCALCITONIN (PCT): CPT

## 2024-08-04 PROCEDURE — 81001 URINALYSIS AUTO W/SCOPE: CPT

## 2024-08-04 PROCEDURE — 93010 ELECTROCARDIOGRAM REPORT: CPT | Performed by: INTERNAL MEDICINE

## 2024-08-04 PROCEDURE — 96375 TX/PRO/DX INJ NEW DRUG ADDON: CPT

## 2024-08-04 PROCEDURE — 6370000000 HC RX 637 (ALT 250 FOR IP)

## 2024-08-04 PROCEDURE — 6360000002 HC RX W HCPCS: Performed by: STUDENT IN AN ORGANIZED HEALTH CARE EDUCATION/TRAINING PROGRAM

## 2024-08-04 PROCEDURE — 87502 INFLUENZA DNA AMP PROBE: CPT

## 2024-08-04 PROCEDURE — 87635 SARS-COV-2 COVID-19 AMP PRB: CPT

## 2024-08-04 PROCEDURE — 6370000000 HC RX 637 (ALT 250 FOR IP): Performed by: INTERNAL MEDICINE

## 2024-08-04 PROCEDURE — 80053 COMPREHEN METABOLIC PANEL: CPT

## 2024-08-04 PROCEDURE — 96361 HYDRATE IV INFUSION ADD-ON: CPT

## 2024-08-04 PROCEDURE — 81025 URINE PREGNANCY TEST: CPT

## 2024-08-04 PROCEDURE — 6370000000 HC RX 637 (ALT 250 FOR IP): Performed by: FAMILY MEDICINE

## 2024-08-04 PROCEDURE — 87154 CUL TYP ID BLD PTHGN 6+ TRGT: CPT

## 2024-08-04 PROCEDURE — 85025 COMPLETE CBC W/AUTO DIFF WBC: CPT

## 2024-08-04 PROCEDURE — 96374 THER/PROPH/DIAG INJ IV PUSH: CPT

## 2024-08-04 PROCEDURE — 87077 CULTURE AEROBIC IDENTIFY: CPT

## 2024-08-04 PROCEDURE — 83690 ASSAY OF LIPASE: CPT

## 2024-08-04 PROCEDURE — 87651 STREP A DNA AMP PROBE: CPT

## 2024-08-04 PROCEDURE — 2580000003 HC RX 258: Performed by: STUDENT IN AN ORGANIZED HEALTH CARE EDUCATION/TRAINING PROGRAM

## 2024-08-04 PROCEDURE — 87086 URINE CULTURE/COLONY COUNT: CPT

## 2024-08-04 PROCEDURE — 83605 ASSAY OF LACTIC ACID: CPT

## 2024-08-04 RX ORDER — SODIUM CHLORIDE 9 MG/ML
INJECTION, SOLUTION INTRAVENOUS CONTINUOUS
Status: ACTIVE | OUTPATIENT
Start: 2024-08-04 | End: 2024-08-06

## 2024-08-04 RX ORDER — IBUPROFEN 600 MG/1
600 TABLET ORAL
Status: COMPLETED | OUTPATIENT
Start: 2024-08-04 | End: 2024-08-04

## 2024-08-04 RX ORDER — HYDROCODONE BITARTRATE AND ACETAMINOPHEN 5; 325 MG/1; MG/1
1 TABLET ORAL EVERY 6 HOURS PRN
Status: DISCONTINUED | OUTPATIENT
Start: 2024-08-04 | End: 2024-08-07 | Stop reason: HOSPADM

## 2024-08-04 RX ORDER — SODIUM CHLORIDE 9 MG/ML
INJECTION, SOLUTION INTRAVENOUS PRN
Status: DISCONTINUED | OUTPATIENT
Start: 2024-08-04 | End: 2024-08-07 | Stop reason: HOSPADM

## 2024-08-04 RX ORDER — ONDANSETRON 2 MG/ML
4 INJECTION INTRAMUSCULAR; INTRAVENOUS
Status: COMPLETED | OUTPATIENT
Start: 2024-08-04 | End: 2024-08-04

## 2024-08-04 RX ORDER — MAGNESIUM SULFATE IN WATER 40 MG/ML
2000 INJECTION, SOLUTION INTRAVENOUS PRN
Status: DISCONTINUED | OUTPATIENT
Start: 2024-08-04 | End: 2024-08-07 | Stop reason: HOSPADM

## 2024-08-04 RX ORDER — SODIUM CHLORIDE 0.9 % (FLUSH) 0.9 %
5-40 SYRINGE (ML) INJECTION EVERY 12 HOURS SCHEDULED
Status: DISCONTINUED | OUTPATIENT
Start: 2024-08-04 | End: 2024-08-07 | Stop reason: HOSPADM

## 2024-08-04 RX ORDER — ACETAMINOPHEN 650 MG/1
650 SUPPOSITORY RECTAL EVERY 6 HOURS PRN
Status: DISCONTINUED | OUTPATIENT
Start: 2024-08-04 | End: 2024-08-07 | Stop reason: HOSPADM

## 2024-08-04 RX ORDER — IBUPROFEN 600 MG/1
600 TABLET ORAL EVERY 6 HOURS PRN
Status: DISCONTINUED | OUTPATIENT
Start: 2024-08-04 | End: 2024-08-07 | Stop reason: HOSPADM

## 2024-08-04 RX ORDER — ENOXAPARIN SODIUM 100 MG/ML
40 INJECTION SUBCUTANEOUS 2 TIMES DAILY
Status: DISCONTINUED | OUTPATIENT
Start: 2024-08-04 | End: 2024-08-07 | Stop reason: HOSPADM

## 2024-08-04 RX ORDER — POTASSIUM CHLORIDE 7.45 MG/ML
10 INJECTION INTRAVENOUS PRN
Status: DISCONTINUED | OUTPATIENT
Start: 2024-08-04 | End: 2024-08-07 | Stop reason: HOSPADM

## 2024-08-04 RX ORDER — LANOLIN ALCOHOL/MO/W.PET/CERES
5 CREAM (GRAM) TOPICAL NIGHTLY
Status: DISCONTINUED | OUTPATIENT
Start: 2024-08-04 | End: 2024-08-07 | Stop reason: HOSPADM

## 2024-08-04 RX ORDER — ONDANSETRON 4 MG/1
4 TABLET, ORALLY DISINTEGRATING ORAL EVERY 8 HOURS PRN
Status: DISCONTINUED | OUTPATIENT
Start: 2024-08-04 | End: 2024-08-07 | Stop reason: HOSPADM

## 2024-08-04 RX ORDER — ALPRAZOLAM 0.5 MG/1
0.25 TABLET ORAL NIGHTLY PRN
Status: DISCONTINUED | OUTPATIENT
Start: 2024-08-04 | End: 2024-08-05

## 2024-08-04 RX ORDER — ACETAMINOPHEN 325 MG/1
650 TABLET ORAL
Status: COMPLETED | OUTPATIENT
Start: 2024-08-04 | End: 2024-08-04

## 2024-08-04 RX ORDER — POTASSIUM CHLORIDE 20 MEQ/1
40 TABLET, EXTENDED RELEASE ORAL PRN
Status: DISCONTINUED | OUTPATIENT
Start: 2024-08-04 | End: 2024-08-07 | Stop reason: HOSPADM

## 2024-08-04 RX ORDER — POLYETHYLENE GLYCOL 3350 17 G/17G
17 POWDER, FOR SOLUTION ORAL DAILY PRN
Status: DISCONTINUED | OUTPATIENT
Start: 2024-08-04 | End: 2024-08-07 | Stop reason: HOSPADM

## 2024-08-04 RX ORDER — SODIUM CHLORIDE 0.9 % (FLUSH) 0.9 %
5-40 SYRINGE (ML) INJECTION PRN
Status: DISCONTINUED | OUTPATIENT
Start: 2024-08-04 | End: 2024-08-07 | Stop reason: HOSPADM

## 2024-08-04 RX ORDER — MORPHINE SULFATE 4 MG/ML
4 INJECTION, SOLUTION INTRAMUSCULAR; INTRAVENOUS ONCE
Status: COMPLETED | OUTPATIENT
Start: 2024-08-04 | End: 2024-08-04

## 2024-08-04 RX ORDER — 0.9 % SODIUM CHLORIDE 0.9 %
1000 INTRAVENOUS SOLUTION INTRAVENOUS
Status: COMPLETED | OUTPATIENT
Start: 2024-08-04 | End: 2024-08-04

## 2024-08-04 RX ORDER — MORPHINE SULFATE 2 MG/ML
1 INJECTION, SOLUTION INTRAMUSCULAR; INTRAVENOUS EVERY 4 HOURS PRN
Status: DISCONTINUED | OUTPATIENT
Start: 2024-08-04 | End: 2024-08-06

## 2024-08-04 RX ORDER — ACETAMINOPHEN 325 MG/1
650 TABLET ORAL EVERY 6 HOURS PRN
Status: DISCONTINUED | OUTPATIENT
Start: 2024-08-04 | End: 2024-08-07 | Stop reason: HOSPADM

## 2024-08-04 RX ORDER — ONDANSETRON 2 MG/ML
4 INJECTION INTRAMUSCULAR; INTRAVENOUS EVERY 6 HOURS PRN
Status: DISCONTINUED | OUTPATIENT
Start: 2024-08-04 | End: 2024-08-07 | Stop reason: HOSPADM

## 2024-08-04 RX ADMIN — ONDANSETRON 4 MG: 2 INJECTION INTRAMUSCULAR; INTRAVENOUS at 05:06

## 2024-08-04 RX ADMIN — IBUPROFEN 600 MG: 600 TABLET, FILM COATED ORAL at 22:37

## 2024-08-04 RX ADMIN — ENOXAPARIN SODIUM 40 MG: 100 INJECTION SUBCUTANEOUS at 13:03

## 2024-08-04 RX ADMIN — ACETAMINOPHEN 650 MG: 325 TABLET ORAL at 15:17

## 2024-08-04 RX ADMIN — ACETAMINOPHEN 650 MG: 325 TABLET ORAL at 05:06

## 2024-08-04 RX ADMIN — SODIUM CHLORIDE 1000 ML: 9 INJECTION, SOLUTION INTRAVENOUS at 05:13

## 2024-08-04 RX ADMIN — ENOXAPARIN SODIUM 40 MG: 100 INJECTION SUBCUTANEOUS at 21:07

## 2024-08-04 RX ADMIN — IOPAMIDOL 100 ML: 755 INJECTION, SOLUTION INTRAVENOUS at 06:13

## 2024-08-04 RX ADMIN — ALPRAZOLAM 0.25 MG: 0.5 TABLET ORAL at 21:07

## 2024-08-04 RX ADMIN — IBUPROFEN 600 MG: 600 TABLET, FILM COATED ORAL at 05:06

## 2024-08-04 RX ADMIN — ACETAMINOPHEN 650 MG: 325 TABLET ORAL at 21:12

## 2024-08-04 RX ADMIN — SODIUM CHLORIDE: 9 INJECTION, SOLUTION INTRAVENOUS at 12:04

## 2024-08-04 RX ADMIN — CEFTRIAXONE 1000 MG: 1 INJECTION, POWDER, FOR SOLUTION INTRAMUSCULAR; INTRAVENOUS at 05:15

## 2024-08-04 RX ADMIN — MORPHINE SULFATE 4 MG: 4 INJECTION INTRAVENOUS at 05:08

## 2024-08-04 RX ADMIN — Medication 4.5 MG: at 21:07

## 2024-08-04 RX ADMIN — HYDROCODONE BITARTRATE AND ACETAMINOPHEN 1 TABLET: 5; 325 TABLET ORAL at 21:11

## 2024-08-04 RX ADMIN — SODIUM CHLORIDE, PRESERVATIVE FREE 10 ML: 5 INJECTION INTRAVENOUS at 12:06

## 2024-08-04 RX ADMIN — CEFTRIAXONE 1000 MG: 1 INJECTION, POWDER, FOR SOLUTION INTRAMUSCULAR; INTRAVENOUS at 13:02

## 2024-08-04 ASSESSMENT — LIFESTYLE VARIABLES
HOW OFTEN DO YOU HAVE A DRINK CONTAINING ALCOHOL: 2-4 TIMES A MONTH
HOW MANY STANDARD DRINKS CONTAINING ALCOHOL DO YOU HAVE ON A TYPICAL DAY: 1 OR 2

## 2024-08-04 ASSESSMENT — PAIN SCALES - GENERAL
PAINLEVEL_OUTOF10: 1
PAINLEVEL_OUTOF10: 3
PAINLEVEL_OUTOF10: 7
PAINLEVEL_OUTOF10: 0
PAINLEVEL_OUTOF10: 6
PAINLEVEL_OUTOF10: 9

## 2024-08-04 ASSESSMENT — PAIN DESCRIPTION - LOCATION
LOCATION: ABDOMEN
LOCATION: HEAD
LOCATION: GENERALIZED

## 2024-08-04 ASSESSMENT — PAIN DESCRIPTION - DESCRIPTORS
DESCRIPTORS: CRAMPING;ACHING
DESCRIPTORS: ACHING

## 2024-08-04 NOTE — H&P
Hospitalist History and Physical   Admit Date:  2024  4:20 AM   Name:  Tamar Navarro   Age:  33 y.o.  Sex:  female  :  1990   MRN:  [id number]   Room:  [facility]    Presenting/Chief Complaint: Abdominal Pain     Reason(s) for Admission: Septicemia (HCC) [A41.9]  Acute cystitis with hematuria [N30.01]     History of Present Illness:   Tamar Navarro is a 33 y.o. female with medical history of morbid obesity, EMILIE who presented with bodyaches, generalized abdominal pain fever.  Patient's symptoms started yesterday and began feeling pains to her lower back.  Patient stated that she had taken Tylenol and had no relief of her symptoms.  Patient presented with a fever of 101.2, tachycardic 112.  Patient potassium 3.2, creatinine 1.19, Pro-Ricci 3.2, lactate of 2.6 resolved to 0.5, WBC of 14.5.  UA with +2 bacteria and positive leuk esterase and nitrite.  CT abdomen pelvis with evidence of pyelonephritis.  Sepsis protocol initiated in emergency department.      Assessment & Plan:   Septicemia (HCC)  -Secondary to pyelonephritis  - IV fluid  - Rocephin  - Follow-up urine culture  - Follow-up blood culture    Pyelonephritis  - Continue IV Rocephin    Morbid obesity  - Complicates care    Lactic acidosis  - Initial lactate of 2.6 currently resolved    Leukocytosis  - WBC of 14.5  - Secondary to urosepsis      PT/OT evals ordered?  Not ordered; patient not expected to need rehab  Diet: ADULT DIET; Regular; 3 carb choices (45 gm/meal); Low Fat/Low Chol/High Fiber/2 gm Na  VTE prophylaxis: Lovenox  Code status: Full Code      Non-peripheral Lines and Tubes (if present):             Hospital Problems:  Principal Problem:    Septicemia (HCC)  Resolved Problems:    * No resolved hospital problems. *        Objective:   Patient Vitals for the past 24 hrs:   Temp Pulse Resp BP SpO2   24 1152 97.9 °F (36.6 °C) 88 18 112/69 95 %   24 1113 -- -- -- 104/70 --   24 1054 -- 89 18 -- 99 %   24

## 2024-08-04 NOTE — ED NOTES
TRANSFER - OUT REPORT:    Verbal report given to BERTRAND Bagley on Tamar Navarro  being transferred to [facility] for routine progression of patient care       Report consisted of patient's Situation, Background, Assessment and   Recommendations(SBAR).     Information from the following report(s) Nurse Handoff Report was reviewed with the receiving nurse.    Varinder Fall Assessment:    Presents to emergency department  because of falls (Syncope, seizure, or loss of consciousness): No  Age > 70: No  Altered Mental Status, Intoxication with alcohol or substance confusion (Disorientation, impaired judgment, poor safety awaremess, or inability to follow instructions): No  Impaired Mobility: Ambulates or transfers with assistive devices or assistance; Unable to ambulate or transer.: No  Nursing Judgement: No          Lines:   Peripheral IV 08/04/24 Left Antecubital (Active)        Opportunity for questions and clarification was provided.      Patient transported with:  Radhika Joyce RN  08/04/24 [id number]

## 2024-08-04 NOTE — ED TRIAGE NOTES
Generalized abd pain, lower back pain. Started yesterday. Nausea today. Denies sick contacts. Reports pain in abd gets worse after urinating.

## 2024-08-04 NOTE — ED PROVIDER NOTES
Negative NEG           XR CHEST (2 VW)   Final Result   No acute findings in the chest.            Report signed on 08/04/2024 (06:31 Eastern Time)   Signed by: Jeff King M.D.   Reading Location: 336      CT ABDOMEN PELVIS W IV CONTRAST Additional Contrast? None    (Results Pending)                No results for input(s): \"COVID19\" in the last 72 hours.    Voice dictation software was used during the making of this note.  This software is not perfect and grammatical and other typographical errors may be present.  This note has not been completely proofread for errors.     Julianna Camejo PA-C  08/04/24 0673

## 2024-08-05 LAB
ALBUMIN SERPL-MCNC: 2.3 G/DL (ref 3.5–5)
ALBUMIN/GLOB SERPL: 0.6 (ref 1–1.9)
ALP SERPL-CCNC: 51 U/L (ref 35–104)
ALT SERPL-CCNC: 8 U/L (ref 12–65)
ANION GAP SERPL CALC-SCNC: 8 MMOL/L (ref 9–18)
AST SERPL-CCNC: 12 U/L (ref 15–37)
BASOPHILS # BLD: 0 K/UL (ref 0–0.2)
BASOPHILS NFR BLD: 0 % (ref 0–2)
BILIRUB SERPL-MCNC: 0.6 MG/DL (ref 0–1.2)
BUN SERPL-MCNC: 13 MG/DL (ref 6–23)
CALCIUM SERPL-MCNC: 7.9 MG/DL (ref 8.8–10.2)
CHLORIDE SERPL-SCNC: 103 MMOL/L (ref 98–107)
CO2 SERPL-SCNC: 25 MMOL/L (ref 20–28)
CREAT SERPL-MCNC: 1.23 MG/DL (ref 0.6–1.1)
DIFFERENTIAL METHOD BLD: ABNORMAL
EOSINOPHIL # BLD: 0 K/UL (ref 0–0.8)
EOSINOPHIL NFR BLD: 0 % (ref 0.5–7.8)
ERYTHROCYTE [DISTWIDTH] IN BLOOD BY AUTOMATED COUNT: 15.9 % (ref 11.9–14.6)
GLOBULIN SER CALC-MCNC: 3.6 G/DL (ref 2.3–3.5)
GLUCOSE SERPL-MCNC: 117 MG/DL (ref 70–99)
HCT VFR BLD AUTO: 34.6 % (ref 35.8–46.3)
HGB BLD-MCNC: 10.4 G/DL (ref 11.7–15.4)
IMM GRANULOCYTES # BLD AUTO: 0.1 K/UL (ref 0–0.5)
IMM GRANULOCYTES NFR BLD AUTO: 1 % (ref 0–5)
LYMPHOCYTES # BLD: 1.3 K/UL (ref 0.5–4.6)
LYMPHOCYTES NFR BLD: 12 % (ref 13–44)
MAGNESIUM SERPL-MCNC: 1.6 MG/DL (ref 1.8–2.4)
MCH RBC QN AUTO: 26.9 PG (ref 26.1–32.9)
MCHC RBC AUTO-ENTMCNC: 30.1 G/DL (ref 31.4–35)
MCV RBC AUTO: 89.6 FL (ref 82–102)
MONOCYTES # BLD: 0.8 K/UL (ref 0.1–1.3)
MONOCYTES NFR BLD: 7 % (ref 4–12)
NEUTS SEG # BLD: 8.5 K/UL (ref 1.7–8.2)
NEUTS SEG NFR BLD: 80 % (ref 43–78)
NRBC # BLD: 0 K/UL (ref 0–0.2)
PLATELET # BLD AUTO: 175 K/UL (ref 150–450)
PLATELET COMMENT: ADEQUATE
PMV BLD AUTO: 10.4 FL (ref 9.4–12.3)
POTASSIUM SERPL-SCNC: 3.5 MMOL/L (ref 3.5–5.1)
PROT SERPL-MCNC: 5.9 G/DL (ref 6.3–8.2)
RBC # BLD AUTO: 3.86 M/UL (ref 4.05–5.2)
RBC MORPH BLD: ABNORMAL
RBC MORPH BLD: ABNORMAL
SODIUM SERPL-SCNC: 137 MMOL/L (ref 136–145)
WBC # BLD AUTO: 10.7 K/UL (ref 4.3–11.1)
WBC MORPH BLD: ABNORMAL

## 2024-08-05 PROCEDURE — 2580000003 HC RX 258: Performed by: STUDENT IN AN ORGANIZED HEALTH CARE EDUCATION/TRAINING PROGRAM

## 2024-08-05 PROCEDURE — 6360000002 HC RX W HCPCS: Performed by: STUDENT IN AN ORGANIZED HEALTH CARE EDUCATION/TRAINING PROGRAM

## 2024-08-05 PROCEDURE — 36415 COLL VENOUS BLD VENIPUNCTURE: CPT

## 2024-08-05 PROCEDURE — 1100000003 HC PRIVATE W/ TELEMETRY

## 2024-08-05 PROCEDURE — 93005 ELECTROCARDIOGRAM TRACING: CPT | Performed by: NURSE PRACTITIONER

## 2024-08-05 PROCEDURE — 6370000000 HC RX 637 (ALT 250 FOR IP): Performed by: FAMILY MEDICINE

## 2024-08-05 PROCEDURE — 6360000002 HC RX W HCPCS: Performed by: INTERNAL MEDICINE

## 2024-08-05 PROCEDURE — 6370000000 HC RX 637 (ALT 250 FOR IP): Performed by: STUDENT IN AN ORGANIZED HEALTH CARE EDUCATION/TRAINING PROGRAM

## 2024-08-05 PROCEDURE — 6370000000 HC RX 637 (ALT 250 FOR IP): Performed by: INTERNAL MEDICINE

## 2024-08-05 PROCEDURE — 83735 ASSAY OF MAGNESIUM: CPT

## 2024-08-05 PROCEDURE — 80053 COMPREHEN METABOLIC PANEL: CPT

## 2024-08-05 PROCEDURE — 2580000003 HC RX 258: Performed by: INTERNAL MEDICINE

## 2024-08-05 PROCEDURE — 85025 COMPLETE CBC W/AUTO DIFF WBC: CPT

## 2024-08-05 PROCEDURE — 84484 ASSAY OF TROPONIN QUANT: CPT

## 2024-08-05 PROCEDURE — 1100000000 HC RM PRIVATE

## 2024-08-05 RX ORDER — LANOLIN ALCOHOL/MO/W.PET/CERES
400 CREAM (GRAM) TOPICAL DAILY
Status: DISCONTINUED | OUTPATIENT
Start: 2024-08-05 | End: 2024-08-06

## 2024-08-05 RX ORDER — ALPRAZOLAM 0.5 MG/1
0.25 TABLET ORAL 4 TIMES DAILY PRN
Status: DISCONTINUED | OUTPATIENT
Start: 2024-08-05 | End: 2024-08-07 | Stop reason: HOSPADM

## 2024-08-05 RX ORDER — POTASSIUM CHLORIDE 20 MEQ/1
40 TABLET, EXTENDED RELEASE ORAL ONCE
Status: COMPLETED | OUTPATIENT
Start: 2024-08-05 | End: 2024-08-05

## 2024-08-05 RX ADMIN — POTASSIUM CHLORIDE 40 MEQ: 1500 TABLET, EXTENDED RELEASE ORAL at 12:43

## 2024-08-05 RX ADMIN — SODIUM CHLORIDE, PRESERVATIVE FREE 10 ML: 5 INJECTION INTRAVENOUS at 20:55

## 2024-08-05 RX ADMIN — ACETAMINOPHEN 650 MG: 325 TABLET ORAL at 16:19

## 2024-08-05 RX ADMIN — HYDROCODONE BITARTRATE AND ACETAMINOPHEN 1 TABLET: 5; 325 TABLET ORAL at 10:29

## 2024-08-05 RX ADMIN — ALPRAZOLAM 0.25 MG: 0.5 TABLET ORAL at 22:20

## 2024-08-05 RX ADMIN — ENOXAPARIN SODIUM 40 MG: 100 INJECTION SUBCUTANEOUS at 20:54

## 2024-08-05 RX ADMIN — MAGNESIUM GLUCONATE 500 MG ORAL TABLET 400 MG: 500 TABLET ORAL at 12:43

## 2024-08-05 RX ADMIN — ENOXAPARIN SODIUM 40 MG: 100 INJECTION SUBCUTANEOUS at 10:31

## 2024-08-05 RX ADMIN — ALPRAZOLAM 0.25 MG: 0.5 TABLET ORAL at 13:47

## 2024-08-05 RX ADMIN — SODIUM CHLORIDE: 9 INJECTION, SOLUTION INTRAVENOUS at 19:12

## 2024-08-05 RX ADMIN — CEFTRIAXONE SODIUM 2000 MG: 2 INJECTION, POWDER, FOR SOLUTION INTRAMUSCULAR; INTRAVENOUS at 12:44

## 2024-08-05 RX ADMIN — SODIUM CHLORIDE, PRESERVATIVE FREE 10 ML: 5 INJECTION INTRAVENOUS at 12:53

## 2024-08-05 RX ADMIN — Medication 4.5 MG: at 20:54

## 2024-08-05 RX ADMIN — SODIUM CHLORIDE: 9 INJECTION, SOLUTION INTRAVENOUS at 03:38

## 2024-08-05 RX ADMIN — ACETAMINOPHEN 650 MG: 325 TABLET ORAL at 22:20

## 2024-08-05 ASSESSMENT — PAIN DESCRIPTION - DESCRIPTORS: DESCRIPTORS: ACHING

## 2024-08-05 ASSESSMENT — PAIN SCALES - GENERAL
PAINLEVEL_OUTOF10: 8
PAINLEVEL_OUTOF10: 0

## 2024-08-05 ASSESSMENT — PAIN - FUNCTIONAL ASSESSMENT: PAIN_FUNCTIONAL_ASSESSMENT: PREVENTS OR INTERFERES SOME ACTIVE ACTIVITIES AND ADLS

## 2024-08-05 ASSESSMENT — PAIN DESCRIPTION - LOCATION: LOCATION: ABDOMEN;BACK

## 2024-08-05 ASSESSMENT — PAIN DESCRIPTION - ORIENTATION: ORIENTATION: RIGHT;LEFT

## 2024-08-05 NOTE — PLAN OF CARE
Problem: Discharge Planning  Goal: Discharge to home or other facility with appropriate resources  8/4/2024 2013 by Dwayne Marquez RN  Outcome: Progressing  8/4/2024 2013 by Dwayne Marquez RN  Outcome: Progressing  8/4/2024 1200 by Yudi Herrera RN  Outcome: Progressing     Problem: Pain  Goal: Verbalizes/displays adequate comfort level or baseline comfort level  8/4/2024 2013 by Dwayne Marquez RN  Outcome: Progressing  8/4/2024 2013 by Dwayne Marquez RN  Outcome: Progressing  8/4/2024 1200 by Yudi Herrera RN  Outcome: Progressing     Problem: Safety - Adult  Goal: Free from fall injury  8/4/2024 2013 by Dwayne Marquez RN  Outcome: Progressing  8/4/2024 2013 by Dwayne Marquez RN  Outcome: Progressing  8/4/2024 1200 by Yudi Herrera RN  Outcome: Progressing

## 2024-08-06 LAB
ANION GAP SERPL CALC-SCNC: 10 MMOL/L (ref 9–18)
ANION GAP SERPL CALC-SCNC: 10 MMOL/L (ref 9–18)
BACTERIA SPEC CULT: ABNORMAL
BASOPHILS # BLD: 0 K/UL (ref 0–0.2)
BASOPHILS NFR BLD: 0 % (ref 0–2)
BUN SERPL-MCNC: 10 MG/DL (ref 6–23)
BUN SERPL-MCNC: 11 MG/DL (ref 6–23)
CALCIUM SERPL-MCNC: 7.6 MG/DL (ref 8.8–10.2)
CALCIUM SERPL-MCNC: 8 MG/DL (ref 8.8–10.2)
CHLORIDE SERPL-SCNC: 100 MMOL/L (ref 98–107)
CHLORIDE SERPL-SCNC: 100 MMOL/L (ref 98–107)
CO2 SERPL-SCNC: 23 MMOL/L (ref 20–28)
CO2 SERPL-SCNC: 23 MMOL/L (ref 20–28)
CREAT SERPL-MCNC: 1.08 MG/DL (ref 0.6–1.1)
CREAT SERPL-MCNC: 1.19 MG/DL (ref 0.6–1.1)
DIFFERENTIAL METHOD BLD: ABNORMAL
EKG ATRIAL RATE: 113 BPM
EKG DIAGNOSIS: NORMAL
EKG P AXIS: 73 DEGREES
EKG P-R INTERVAL: 126 MS
EKG Q-T INTERVAL: 318 MS
EKG QRS DURATION: 68 MS
EKG QTC CALCULATION (BAZETT): 436 MS
EKG R AXIS: 41 DEGREES
EKG T AXIS: 25 DEGREES
EKG VENTRICULAR RATE: 113 BPM
EOSINOPHIL # BLD: 0 K/UL (ref 0–0.8)
EOSINOPHIL NFR BLD: 0 % (ref 0.5–7.8)
ERYTHROCYTE [DISTWIDTH] IN BLOOD BY AUTOMATED COUNT: 15.9 % (ref 11.9–14.6)
GLUCOSE SERPL-MCNC: 104 MG/DL (ref 70–99)
GLUCOSE SERPL-MCNC: 134 MG/DL (ref 70–99)
GRAM STN SPEC: ABNORMAL
HCT VFR BLD AUTO: 33.6 % (ref 35.8–46.3)
HGB BLD-MCNC: 10 G/DL (ref 11.7–15.4)
IMM GRANULOCYTES # BLD AUTO: 0.1 K/UL (ref 0–0.5)
IMM GRANULOCYTES NFR BLD AUTO: 1 % (ref 0–5)
LYMPHOCYTES # BLD: 1.8 K/UL (ref 0.5–4.6)
LYMPHOCYTES NFR BLD: 24 % (ref 13–44)
MAGNESIUM SERPL-MCNC: 1.6 MG/DL (ref 1.8–2.4)
MCH RBC QN AUTO: 26.1 PG (ref 26.1–32.9)
MCHC RBC AUTO-ENTMCNC: 29.8 G/DL (ref 31.4–35)
MCV RBC AUTO: 87.7 FL (ref 82–102)
MONOCYTES # BLD: 0.5 K/UL (ref 0.1–1.3)
MONOCYTES NFR BLD: 7 % (ref 4–12)
NEUTS SEG # BLD: 5.1 K/UL (ref 1.7–8.2)
NEUTS SEG NFR BLD: 68 % (ref 43–78)
NRBC # BLD: 0 K/UL (ref 0–0.2)
PLATELET # BLD AUTO: 196 K/UL (ref 150–450)
PMV BLD AUTO: 10.6 FL (ref 9.4–12.3)
POTASSIUM SERPL-SCNC: 3.2 MMOL/L (ref 3.5–5.1)
POTASSIUM SERPL-SCNC: 3.8 MMOL/L (ref 3.5–5.1)
RBC # BLD AUTO: 3.83 M/UL (ref 4.05–5.2)
SERVICE CMNT-IMP: ABNORMAL
SODIUM SERPL-SCNC: 133 MMOL/L (ref 136–145)
SODIUM SERPL-SCNC: 134 MMOL/L (ref 136–145)
TROPONIN T SERPL HS-MCNC: <6 NG/L (ref 0–14)
WBC # BLD AUTO: 7.5 K/UL (ref 4.3–11.1)

## 2024-08-06 PROCEDURE — 6370000000 HC RX 637 (ALT 250 FOR IP): Performed by: STUDENT IN AN ORGANIZED HEALTH CARE EDUCATION/TRAINING PROGRAM

## 2024-08-06 PROCEDURE — 83735 ASSAY OF MAGNESIUM: CPT

## 2024-08-06 PROCEDURE — 2580000003 HC RX 258: Performed by: INTERNAL MEDICINE

## 2024-08-06 PROCEDURE — 2700000000 HC OXYGEN THERAPY PER DAY

## 2024-08-06 PROCEDURE — 80048 BASIC METABOLIC PNL TOTAL CA: CPT

## 2024-08-06 PROCEDURE — 93010 ELECTROCARDIOGRAM REPORT: CPT | Performed by: INTERNAL MEDICINE

## 2024-08-06 PROCEDURE — 6370000000 HC RX 637 (ALT 250 FOR IP): Performed by: INTERNAL MEDICINE

## 2024-08-06 PROCEDURE — 1100000003 HC PRIVATE W/ TELEMETRY

## 2024-08-06 PROCEDURE — 02HV33Z INSERTION OF INFUSION DEVICE INTO SUPERIOR VENA CAVA, PERCUTANEOUS APPROACH: ICD-10-PCS | Performed by: STUDENT IN AN ORGANIZED HEALTH CARE EDUCATION/TRAINING PROGRAM

## 2024-08-06 PROCEDURE — 6360000002 HC RX W HCPCS: Performed by: INTERNAL MEDICINE

## 2024-08-06 PROCEDURE — 2580000003 HC RX 258: Performed by: STUDENT IN AN ORGANIZED HEALTH CARE EDUCATION/TRAINING PROGRAM

## 2024-08-06 PROCEDURE — 6360000002 HC RX W HCPCS: Performed by: STUDENT IN AN ORGANIZED HEALTH CARE EDUCATION/TRAINING PROGRAM

## 2024-08-06 PROCEDURE — 36415 COLL VENOUS BLD VENIPUNCTURE: CPT

## 2024-08-06 PROCEDURE — 85025 COMPLETE CBC W/AUTO DIFF WBC: CPT

## 2024-08-06 PROCEDURE — 94760 N-INVAS EAR/PLS OXIMETRY 1: CPT

## 2024-08-06 PROCEDURE — 76937 US GUIDE VASCULAR ACCESS: CPT

## 2024-08-06 RX ORDER — LANOLIN ALCOHOL/MO/W.PET/CERES
400 CREAM (GRAM) TOPICAL 2 TIMES DAILY
Status: DISCONTINUED | OUTPATIENT
Start: 2024-08-06 | End: 2024-08-07 | Stop reason: HOSPADM

## 2024-08-06 RX ORDER — SODIUM CHLORIDE 9 MG/ML
INJECTION, SOLUTION INTRAVENOUS CONTINUOUS
Status: DISCONTINUED | OUTPATIENT
Start: 2024-08-06 | End: 2024-08-07 | Stop reason: HOSPADM

## 2024-08-06 RX ADMIN — MAGNESIUM GLUCONATE 500 MG ORAL TABLET 400 MG: 500 TABLET ORAL at 08:55

## 2024-08-06 RX ADMIN — IBUPROFEN 600 MG: 600 TABLET, FILM COATED ORAL at 00:30

## 2024-08-06 RX ADMIN — ALPRAZOLAM 0.25 MG: 0.5 TABLET ORAL at 22:08

## 2024-08-06 RX ADMIN — MAGNESIUM GLUCONATE 500 MG ORAL TABLET 400 MG: 500 TABLET ORAL at 21:01

## 2024-08-06 RX ADMIN — ENOXAPARIN SODIUM 40 MG: 100 INJECTION SUBCUTANEOUS at 21:01

## 2024-08-06 RX ADMIN — SODIUM CHLORIDE, PRESERVATIVE FREE 10 ML: 5 INJECTION INTRAVENOUS at 21:01

## 2024-08-06 RX ADMIN — SODIUM CHLORIDE: 9 INJECTION, SOLUTION INTRAVENOUS at 12:35

## 2024-08-06 RX ADMIN — Medication 4.5 MG: at 21:01

## 2024-08-06 RX ADMIN — ENOXAPARIN SODIUM 40 MG: 100 INJECTION SUBCUTANEOUS at 08:55

## 2024-08-06 RX ADMIN — ONDANSETRON 4 MG: 4 TABLET, ORALLY DISINTEGRATING ORAL at 10:49

## 2024-08-06 RX ADMIN — ACETAMINOPHEN 650 MG: 325 TABLET ORAL at 22:08

## 2024-08-06 RX ADMIN — CEFTRIAXONE SODIUM 2000 MG: 2 INJECTION, POWDER, FOR SOLUTION INTRAMUSCULAR; INTRAVENOUS at 13:10

## 2024-08-06 ASSESSMENT — PAIN SCALES - GENERAL
PAINLEVEL_OUTOF10: 0
PAINLEVEL_OUTOF10: 0
PAINLEVEL_OUTOF10: 3
PAINLEVEL_OUTOF10: 0

## 2024-08-06 ASSESSMENT — PAIN DESCRIPTION - PAIN TYPE: TYPE: ACUTE PAIN

## 2024-08-06 ASSESSMENT — PAIN DESCRIPTION - LOCATION: LOCATION: HEAD

## 2024-08-06 ASSESSMENT — PAIN DESCRIPTION - DESCRIPTORS: DESCRIPTORS: ACHING

## 2024-08-06 NOTE — PROGRESS NOTES
4 Eyes Skin Assessment     NAME:  Tamar Navarro  YOB: 1990  MEDICAL RECORD NUMBER:  [id number]    The patient is being assessed for  Admission    I agree that at least one RN has performed a thorough Head to Toe Skin Assessment on the patient. ALL assessment sites listed below have been assessed.      Areas assessed by both nurses:    Head, Face, Ears, Shoulders, Back, Chest, Arms, Elbows, Hands, Sacrum. Buttock, Coccyx, Ischium, Legs. Feet and Heels, Under Medical Devices , and Other          Does the Patient have a Wound? No noted wound(s)       Yuri Prevention initiated by RN: No  Wound Care Orders initiated by RN: No    Pressure Injury (Stage 3,4, Unstageable, DTI, NWPT, and Complex wounds) if present, place Wound referral order by RN under : No    New Ostomies, if present place, Ostomy referral order under : No     Patient has tattoo's noted to bilateral arms, multiple facial piercing's, bilateral ear piercing's, and vagina piercing.     Nurse 1 eSignature: Electronically signed by Yudi Herrera RN on 8/4/24 at 1:53 PM EDT    **SHARE this note so that the co-signing nurse can place an eSignature**    Nurse 2 eSignature: Electronically signed by Mone Phelps RN on 8/4/24 at 6:33 PM EDT   
Pt c/o chest tightness and also having panic attack at this time.  O2 93% on RA and pt febrile. PRN medications administered. Pt also lost IV access. Unable to gain access, MD attempted with US guided and unable to obtain as well. NP notified via perfectserve. STAT troponin and EKG ordered. Vascular team consult placed and per NP ok to hold IVF through the night.   
Pt resting, denies needs at this time.  A&Ox4.  Febrile 101.7 earlier in shift.  MD notified.  Tylenol given, see MAR.  11yr old daughter at bedside awaiting grandmother to pick her up.  IVF infusing.  Abx given as ordered.  PRN pain med given 1x.  PRN Xanax given 1x for anxiety.  Hourly rounds completed.  Bed locked and lowered into lowest position.  Call light and personal items within reach.  Report given to night nurse.  
TRANSFER - IN REPORT:    Verbal report received from BERTRAND Faria4 on Tamar Navarro  being received from ED for routine progression of patient care      Report consisted of patient's Situation, Background, Assessment and   Recommendations(SBAR).     Information from the following report(s) ED SBAR, MAR, Recent Results, Neuro Assessment, and Event Log was reviewed with the receiving nurse.    Opportunity for questions and clarification was provided.      Assessment completed upon patient's arrival to unit and care assumed.    
US Guided PIV access    Called for assistance with IV access. Ultrasound was used to find the vein which was compressible and does not have any features of an artery or nerve bundle. Skin was cleaned and disinfected prior to IV puncture. Under real-time ultrasound guidance, peripheral access was obtained in the left AC using 22 G 1.00\" Peripheral IV catheter x 1 attempt. Blood return was present and IV flushed without difficulty. IV dressing applied, no immediate complications noted, and patient tolerated the procedure well.      
oz).    Intake/Output Summary (Last 24 hours) at 8/6/2024 1412  Last data filed at 8/6/2024 1043  Gross per 24 hour   Intake --   Output 2650 ml   Net -2650 ml           Physical Exam:     General-alert and oriented x3, cooperative and calm  Eyes-conjunctive are clear pupils equal round react to light extraocular muscles intact    Heart-regular rate and rhythm no rubs gallops clicks or murmurs.  No JVD grossly  Lungs-clear auscultation palpation and percussion, symmetric excursion of the chest wall.  No wheezing    Abdomen-soft, nausea but no localized palpable abdominal pain.  Bowel sounds present all 4 quadrants    Extremities-no significant unilateral lower extremity edema, moves all extremities symmetrically.    Neurologic-cranial nerves II through XII grossly intact, no focal motor deficits grossly.  No tremor      I have personally reviewed labs and tests:  Recent Labs:  Recent Results (from the past 48 hour(s))   Comprehensive Metabolic Panel w/ Reflex to MG    Collection Time: 08/05/24  4:27 AM   Result Value Ref Range    Sodium 137 136 - 145 mmol/L    Potassium 3.5 3.5 - 5.1 mmol/L    Chloride 103 98 - 107 mmol/L    CO2 25 20 - 28 mmol/L    Anion Gap 8 (L) 9 - 18 mmol/L    Glucose 117 (H) 70 - 99 mg/dL    BUN 13 6 - 23 MG/DL    Creatinine 1.23 (H) 0.60 - 1.10 MG/DL    Est, Glom Filt Rate 60 (L) >60 ml/min/1.73m2    Calcium 7.9 (L) 8.8 - 10.2 MG/DL    Total Bilirubin 0.6 0.0 - 1.2 MG/DL    ALT 8 (L) 12 - 65 U/L    AST 12 (L) 15 - 37 U/L    Alk Phosphatase 51 35 - 104 U/L    Total Protein 5.9 (L) 6.3 - 8.2 g/dL    Albumin 2.3 (L) 3.5 - 5.0 g/dL    Globulin 3.6 (H) 2.3 - 3.5 g/dL    Albumin/Globulin Ratio 0.6 (L) 1.0 - 1.9     CBC with Auto Differential    Collection Time: 08/05/24  4:27 AM   Result Value Ref Range    WBC 10.7 4.3 - 11.1 K/uL    RBC 3.86 (L) 4.05 - 5.2 M/uL    Hemoglobin 10.4 (L) 11.7 - 15.4 g/dL    Hematocrit 34.6 (L) 35.8 - 46.3 %    MCV 89.6 82 - 102 FL    MCH 26.9 26.1 - 32.9 PG    MCHC 
08/05/24  4:27 AM   Result Value Ref Range    Sodium 137 136 - 145 mmol/L    Potassium 3.5 3.5 - 5.1 mmol/L    Chloride 103 98 - 107 mmol/L    CO2 25 20 - 28 mmol/L    Anion Gap 8 (L) 9 - 18 mmol/L    Glucose 117 (H) 70 - 99 mg/dL    BUN 13 6 - 23 MG/DL    Creatinine 1.23 (H) 0.60 - 1.10 MG/DL    Est, Glom Filt Rate 60 (L) >60 ml/min/1.73m2    Calcium 7.9 (L) 8.8 - 10.2 MG/DL    Total Bilirubin 0.6 0.0 - 1.2 MG/DL    ALT 8 (L) 12 - 65 U/L    AST 12 (L) 15 - 37 U/L    Alk Phosphatase 51 35 - 104 U/L    Total Protein 5.9 (L) 6.3 - 8.2 g/dL    Albumin 2.3 (L) 3.5 - 5.0 g/dL    Globulin 3.6 (H) 2.3 - 3.5 g/dL    Albumin/Globulin Ratio 0.6 (L) 1.0 - 1.9     CBC with Auto Differential    Collection Time: 08/05/24  4:27 AM   Result Value Ref Range    WBC 10.7 4.3 - 11.1 K/uL    RBC 3.86 (L) 4.05 - 5.2 M/uL    Hemoglobin 10.4 (L) 11.7 - 15.4 g/dL    Hematocrit 34.6 (L) 35.8 - 46.3 %    MCV 89.6 82 - 102 FL    MCH 26.9 26.1 - 32.9 PG    MCHC 30.1 (L) 31.4 - 35.0 g/dL    RDW 15.9 (H) 11.9 - 14.6 %    Platelets 175 150 - 450 K/uL    MPV 10.4 9.4 - 12.3 FL    nRBC 0.00 0.0 - 0.2 K/uL    Neutrophils % 80 (H) 43 - 78 %    Lymphocytes % 12 (L) 13 - 44 %    Monocytes % 7 4.0 - 12.0 %    Eosinophils % 0 (L) 0.5 - 7.8 %    Basophils % 0 0.0 - 2.0 %    Immature Granulocytes % 1 0.0 - 5.0 %    Neutrophils Absolute 8.5 (H) 1.7 - 8.2 K/UL    Lymphocytes Absolute 1.3 0.5 - 4.6 K/UL    Monocytes Absolute 0.8 0.1 - 1.3 K/UL    Eosinophils Absolute 0.0 0.0 - 0.8 K/UL    Basophils Absolute 0.0 0.0 - 0.2 K/UL    Immature Granulocytes Absolute 0.1 0.0 - 0.5 K/UL    RBC Comment ANISOCYTOSIS + POIKILOCYTOSIS      RBC Comment OCCASIONAL  JIMBO CELLS        WBC Comment Result Confirmed By Smear      Platelet Comment ADEQUATE      Differential Type AUTOMATED     Magnesium    Collection Time: 08/05/24  4:27 AM   Result Value Ref Range    Magnesium 1.6 (L) 1.8 - 2.4 mg/dL       Recent Labs     08/04/24  0522   COVID19 Not detected       Current

## 2024-08-06 NOTE — CARE COORDINATION
08/06/24 1429   Service Assessment   Patient Orientation Alert and Oriented   Cognition Alert   History Provided By Patient   Primary Caregiver Self   Support Systems Family Members   Patient's Healthcare Decision Maker is: Legal Next of Kin   PCP Verified by CM Yes   Last Visit to PCP Within last year   Prior Functional Level Independent in ADLs/IADLs   Current Functional Level Independent in ADLs/IADLs   Can patient return to prior living arrangement Yes   Ability to make needs known: Good   Family able to assist with home care needs: Yes   Would you like for me to discuss the discharge plan with any other family members/significant others, and if so, who? Yes   Financial Resources   (Self pay)   Social/Functional History   Lives With Family   ADL Assistance Independent   Homemaking Assistance Independent   Ambulation Assistance Independent   Transfer Assistance Independent   Active  Yes   Mode of Transportation Car     CM met with pt at bedside, correct address: [address] [place] [address] [place], [place]. Pt lives with her 4 children ages 17, 12, 11, 6. Pt drives and has her car here at the hospital to drive herself home. No hx of H/H or DME, no PT/OT evals needed, no needs anticipated. CM will continue to follow for discharge plan/needs.

## 2024-08-07 VITALS
BODY MASS INDEX: 51.91 KG/M2 | HEIGHT: 63 IN | RESPIRATION RATE: 18 BRPM | SYSTOLIC BLOOD PRESSURE: 149 MMHG | DIASTOLIC BLOOD PRESSURE: 95 MMHG | WEIGHT: 293 LBS | TEMPERATURE: 99.1 F | HEART RATE: 98 BPM | OXYGEN SATURATION: 94 %

## 2024-08-07 PROBLEM — E83.42 HYPOMAGNESEMIA: Status: ACTIVE | Noted: 2024-08-07

## 2024-08-07 PROBLEM — R78.81 BACTEREMIA: Status: ACTIVE | Noted: 2024-08-07

## 2024-08-07 LAB
ANION GAP SERPL CALC-SCNC: 9 MMOL/L (ref 9–18)
BASOPHILS # BLD: 0 K/UL (ref 0–0.2)
BASOPHILS NFR BLD: 0 % (ref 0–2)
BUN SERPL-MCNC: 9 MG/DL (ref 6–23)
CALCIUM SERPL-MCNC: 7.7 MG/DL (ref 8.8–10.2)
CHLORIDE SERPL-SCNC: 102 MMOL/L (ref 98–107)
CO2 SERPL-SCNC: 24 MMOL/L (ref 20–28)
CREAT SERPL-MCNC: 0.96 MG/DL (ref 0.6–1.1)
DIFFERENTIAL METHOD BLD: ABNORMAL
EKG ATRIAL RATE: 97 BPM
EKG DIAGNOSIS: NORMAL
EKG P AXIS: 73 DEGREES
EKG P-R INTERVAL: 136 MS
EKG Q-T INTERVAL: 348 MS
EKG QRS DURATION: 68 MS
EKG QTC CALCULATION (BAZETT): 441 MS
EKG R AXIS: 55 DEGREES
EKG T AXIS: 26 DEGREES
EKG VENTRICULAR RATE: 97 BPM
EOSINOPHIL # BLD: 0.1 K/UL (ref 0–0.8)
EOSINOPHIL NFR BLD: 1 % (ref 0.5–7.8)
ERYTHROCYTE [DISTWIDTH] IN BLOOD BY AUTOMATED COUNT: 16.2 % (ref 11.9–14.6)
GLUCOSE SERPL-MCNC: 122 MG/DL (ref 70–99)
HCT VFR BLD AUTO: 30.7 % (ref 35.8–46.3)
HGB BLD-MCNC: 9.4 G/DL (ref 11.7–15.4)
IMM GRANULOCYTES # BLD AUTO: 0 K/UL (ref 0–0.5)
IMM GRANULOCYTES NFR BLD AUTO: 0 % (ref 0–5)
LYMPHOCYTES # BLD: 2.4 K/UL (ref 0.5–4.6)
LYMPHOCYTES NFR BLD: 38 % (ref 13–44)
MCH RBC QN AUTO: 26.6 PG (ref 26.1–32.9)
MCHC RBC AUTO-ENTMCNC: 30.6 G/DL (ref 31.4–35)
MCV RBC AUTO: 87 FL (ref 82–102)
MONOCYTES # BLD: 0.8 K/UL (ref 0.1–1.3)
MONOCYTES NFR BLD: 13 % (ref 4–12)
NEUTS SEG # BLD: 3 K/UL (ref 1.7–8.2)
NEUTS SEG NFR BLD: 48 % (ref 43–78)
NRBC # BLD: 0 K/UL (ref 0–0.2)
PLATELET # BLD AUTO: 217 K/UL (ref 150–450)
PLATELET COMMENT: ADEQUATE
PMV BLD AUTO: 10.3 FL (ref 9.4–12.3)
POTASSIUM SERPL-SCNC: 3.7 MMOL/L (ref 3.5–5.1)
RBC # BLD AUTO: 3.53 M/UL (ref 4.05–5.2)
RBC MORPH BLD: ABNORMAL
SODIUM SERPL-SCNC: 135 MMOL/L (ref 136–145)
WBC # BLD AUTO: 6.3 K/UL (ref 4.3–11.1)
WBC MORPH BLD: ABNORMAL

## 2024-08-07 PROCEDURE — 36415 COLL VENOUS BLD VENIPUNCTURE: CPT

## 2024-08-07 PROCEDURE — 87040 BLOOD CULTURE FOR BACTERIA: CPT

## 2024-08-07 PROCEDURE — 6370000000 HC RX 637 (ALT 250 FOR IP): Performed by: INTERNAL MEDICINE

## 2024-08-07 PROCEDURE — 85025 COMPLETE CBC W/AUTO DIFF WBC: CPT

## 2024-08-07 PROCEDURE — 6360000002 HC RX W HCPCS: Performed by: FAMILY MEDICINE

## 2024-08-07 PROCEDURE — 97530 THERAPEUTIC ACTIVITIES: CPT

## 2024-08-07 PROCEDURE — 80048 BASIC METABOLIC PNL TOTAL CA: CPT

## 2024-08-07 PROCEDURE — 2580000003 HC RX 258: Performed by: STUDENT IN AN ORGANIZED HEALTH CARE EDUCATION/TRAINING PROGRAM

## 2024-08-07 PROCEDURE — 93005 ELECTROCARDIOGRAM TRACING: CPT | Performed by: FAMILY MEDICINE

## 2024-08-07 PROCEDURE — 97161 PT EVAL LOW COMPLEX 20 MIN: CPT

## 2024-08-07 PROCEDURE — 6360000002 HC RX W HCPCS: Performed by: STUDENT IN AN ORGANIZED HEALTH CARE EDUCATION/TRAINING PROGRAM

## 2024-08-07 PROCEDURE — 6370000000 HC RX 637 (ALT 250 FOR IP): Performed by: FAMILY MEDICINE

## 2024-08-07 RX ORDER — IBUPROFEN 600 MG/1
600 TABLET ORAL EVERY 6 HOURS PRN
Qty: 20 TABLET | Refills: 0 | Status: SHIPPED | OUTPATIENT
Start: 2024-08-07 | End: 2024-08-12

## 2024-08-07 RX ORDER — CIPROFLOXACIN 500 MG/1
750 TABLET, FILM COATED ORAL EVERY 12 HOURS SCHEDULED
Status: DISCONTINUED | OUTPATIENT
Start: 2024-08-07 | End: 2024-08-07 | Stop reason: HOSPADM

## 2024-08-07 RX ORDER — HYDROCODONE BITARTRATE AND ACETAMINOPHEN 5; 325 MG/1; MG/1
1 TABLET ORAL EVERY 6 HOURS PRN
Qty: 12 TABLET | Refills: 0 | Status: SHIPPED | OUTPATIENT
Start: 2024-08-07 | End: 2024-08-10

## 2024-08-07 RX ORDER — MAGNESIUM SULFATE IN WATER 40 MG/ML
2000 INJECTION, SOLUTION INTRAVENOUS ONCE
Status: COMPLETED | OUTPATIENT
Start: 2024-08-07 | End: 2024-08-07

## 2024-08-07 RX ORDER — POTASSIUM CHLORIDE 20 MEQ/1
20 TABLET, EXTENDED RELEASE ORAL DAILY
Qty: 4 TABLET | Refills: 0 | Status: SHIPPED | OUTPATIENT
Start: 2024-08-07 | End: 2024-08-11

## 2024-08-07 RX ORDER — LANOLIN ALCOHOL/MO/W.PET/CERES
400 CREAM (GRAM) TOPICAL 2 TIMES DAILY
Qty: 8 TABLET | Refills: 0 | Status: SHIPPED | OUTPATIENT
Start: 2024-08-08 | End: 2024-08-12

## 2024-08-07 RX ORDER — CIPROFLOXACIN 750 MG/1
750 TABLET, FILM COATED ORAL EVERY 12 HOURS SCHEDULED
Qty: 8 TABLET | Refills: 0 | Status: SHIPPED | OUTPATIENT
Start: 2024-08-07 | End: 2024-08-11

## 2024-08-07 RX ORDER — GUAIFENESIN 600 MG/1
600 TABLET, EXTENDED RELEASE ORAL 2 TIMES DAILY
Qty: 14 TABLET | Refills: 0 | Status: SHIPPED | OUTPATIENT
Start: 2024-08-07 | End: 2024-08-14

## 2024-08-07 RX ORDER — POTASSIUM CHLORIDE 20 MEQ/1
40 TABLET, EXTENDED RELEASE ORAL ONCE
Status: COMPLETED | OUTPATIENT
Start: 2024-08-07 | End: 2024-08-07

## 2024-08-07 RX ORDER — GUAIFENESIN 600 MG/1
600 TABLET, EXTENDED RELEASE ORAL 2 TIMES DAILY
Status: DISCONTINUED | OUTPATIENT
Start: 2024-08-07 | End: 2024-08-07 | Stop reason: HOSPADM

## 2024-08-07 RX ADMIN — MAGNESIUM GLUCONATE 500 MG ORAL TABLET 400 MG: 500 TABLET ORAL at 15:25

## 2024-08-07 RX ADMIN — MAGNESIUM GLUCONATE 500 MG ORAL TABLET 400 MG: 500 TABLET ORAL at 09:25

## 2024-08-07 RX ADMIN — ENOXAPARIN SODIUM 40 MG: 100 INJECTION SUBCUTANEOUS at 09:25

## 2024-08-07 RX ADMIN — SODIUM CHLORIDE, PRESERVATIVE FREE 10 ML: 5 INJECTION INTRAVENOUS at 09:25

## 2024-08-07 RX ADMIN — MAGNESIUM SULFATE HEPTAHYDRATE 2000 MG: 40 INJECTION, SOLUTION INTRAVENOUS at 15:30

## 2024-08-07 RX ADMIN — POTASSIUM CHLORIDE 40 MEQ: 1500 TABLET, EXTENDED RELEASE ORAL at 15:24

## 2024-08-07 RX ADMIN — GUAIFENESIN 600 MG: 600 TABLET ORAL at 13:05

## 2024-08-07 ASSESSMENT — PAIN DESCRIPTION - LOCATION: LOCATION: ABDOMEN;CHEST

## 2024-08-07 ASSESSMENT — PAIN SCALES - GENERAL: PAINLEVEL_OUTOF10: 7

## 2024-08-07 ASSESSMENT — PAIN DESCRIPTION - DESCRIPTORS: DESCRIPTORS: PRESSURE

## 2024-08-07 NOTE — DISCHARGE INSTRUCTIONS
Advised to call primary care physician tomorrow, 8/8/2024, see the primary care within a week, with a CBC BMP and magnesium.  Patient EKG has normal Qtc.  Explained the importance of low potassium and low magnesium.  Importance of taking potassium and magnesium supplementation.  Advise good hydration 2 L of fluids daily.  Advised not to drive on pain medication.  Advised to come back to the emergency room if any palpitation or any persistent diarrhea or any new fever or any increasing abdominal pain.

## 2024-08-07 NOTE — CONSULTS
Infectious Diseases Consult    Today's Date: 2024   Admit Date: 2024  : 1990    Impression:   E Coli bacteremia (+) source likely 2/2 below  E Coli UTI  Pyelonephritis   CT abd/pelvis with no fluid collection    Plan:   E Coli bacteremia in the setting of UTI, urine cx + E coli  Transition to oral ciprofloxacin 750 mg BID x 7 days total antibiotics (EOT 24)  ID will sign off at this time, please reach out with further questions or concerns     Anti-infectives:   Ceftriaxone  -   Cipro  -     Subjective:   Date of Consultation:  2024  Referring Physician: Heydi Metz MD for: assistance in management of E coli bacteremia/pyelonephritis     Patient is a 33 y.o. female with PMH of morbid obesity and EMILIE. She presented to ED on 24 with c/o fever, body aches and abdominal pain. In ED temp 101.2, . Pro-cait 3.2, WBC 14.5. UA with 2+ bacteria, CT abd/pelvis with evidence of pyelonephritis. Blood and urine cx both + E Coli. ID consulted for antibiotic duration. Today WBC WNL, afebrile. Patient seen resting in bed. Reports urinary symptoms and flank pain are slowly improving. Still feels weak today.     Past Medical History:   Diagnosis Date    Hypertension        Social History     Tobacco Use    Smoking status: Never    Smokeless tobacco: Never   Substance Use Topics    Alcohol use: Yes     Comment: occasional      Past Surgical History:   Procedure Laterality Date     SECTION       History reviewed. No pertinent family history.      Current medications were reviewed     No Known Allergies     Objective:     Visit Vitals  BP (!) 142/98   Pulse 100   Temp 99 °F (37.2 °C) (Oral)   Resp 18   Ht 1.6 m (5' 3\")   Wt (!) 166.2 kg (366 lb 6.5 oz)   SpO2 98%   BMI 64.91 kg/m²     Temp (24hrs), Av.5 °F (37.5 °C), Min:98.4 °F (36.9 °C), Max:100.4 °F (38 °C)       Intake/Output Summary (Last 24 hours) at 2024 0942  Last data filed at 2024 0404  Gross

## 2024-08-07 NOTE — DISCHARGE SUMMARY
Hospitalist Discharge Summary   Admit Date:  2024  4:20 AM   DC Note date: 2024  Name:  Tamar Navarro   Age:  33 y.o.  Sex:  female  :  1990   MRN:  [id number]   Room:  [facility]  PCP:  Catalina Guajardo APRN - NP    Presenting Complaint: Abdominal Pain     Initial Admission Diagnosis: Septicemia (HCC) [A41.9]  Acute cystitis with hematuria [N30.01]     Problem List for this Hospitalization (present on admission):    Principal Problem:    Septicemia (HCC)  Active Problems:    Pyelonephritis    Morbid obesity (HCC)    Lactic acidosis    Leukocytosis    Bacteremia  Resolved Problems:    * No resolved hospital problems. *    H&p  Tamar Navarro is a 33 y.o. female with medical history of morbid obesity, EMILIE who presented with bodyaches, generalized abdominal pain fever.  Patient's symptoms started yesterday and began feeling pains to her lower back.  Patient stated that she had taken Tylenol and had no relief of her symptoms.  Patient presented with a fever of 101.2, tachycardic 112.  Patient potassium 3.2, creatinine 1.19, Pro-Ricci 3.2, lactate of 2.6 resolved to 0.5, WBC of 14.5.  UA with +2 bacteria and positive leuk esterase and nitrite.  CT abdomen pelvis with evidence of pyelonephritis.  Sepsis protocol initiated in emergency department.   Hospital Course:  Pt was admitted for sepsis secondary to pyelonephritis.  Pt was continued on rocephin.  Urine culture and blood culture positive for E.Coli.  Consulted ID - changed to ciprofloxacin 750 mg po bid for EOT 2024.  Pts hypomagnesemia replaced.  Discharged on potassium and magnesium supplementation.  EKG normal qt interval.  No flank pain or abdominal pain.  Pt is clinically stable for discharge.    Disposition: Home  Diet: ADULT DIET; Regular; 3 carb choices (45 gm/meal); Low Fat/Low Chol/High Fiber/2 gm Na  Code Status: Full Code    Follow Ups:  Follow-up Information       Follow up With Specialties Details Why Contact Basil Siegel

## 2024-08-07 NOTE — THERAPY EVALUATION
PHYSICAL THERAPY Initial Assessment, Discharge, and AM  (Link to Caseload Tracking: PT Visit Days : 1  Acknowledge Orders  Time In/Out  PT Charge Capture  Rehab Caseload Tracker    Tamar Navarro is a 33 y.o. female   PRIMARY DIAGNOSIS: Septicemia (HCC)  Septicemia (HCC) [A41.9]  Acute cystitis with hematuria [N30.01]       Reason for Referral:   Difficulty in walking, Not elsewhere classified (R26.2)  Inpatient: Payor: PENDING MEDICAID / Plan: PENDING MEDICAID / Product Type: *No Product type* /     ASSESSMENT:     REHAB RECOMMENDATIONS:   Recommendation to date pending progress:  Setting:  No further skilled physical therapy after discharge from hospital    Equipment:    None     ASSESSMENT:  Ms. Navarro is a 33 y.o. female admitted to hospital on 8/4/24 with c/o fever, body aches, nausea, and general abdominal pain. She relates that prior to admit she was Independent with ADL and was able to drive herself to the ED. She notes that she lives at home with her 4 children (ages 6 to 17), of whom she is the primary caregiver for. Today she presented at baseline mobility with [place] for bed mobility, transfers, and ambulation. She did have some dropping of O2 saturation when on room air but it quickly return to > 94%. At this time recommending d/c home independently. She mentions having completed sleep study with dx of sleep apnea given but never received her CPAP machine. Pt also notes having anxiety. She would likely benefit from referral to dietician and counselor upon d/c to further assist with mobility and anxiety.     [facility] AM-PAC™ “6 Clicks” Basic Mobility Inpatient Short Form       SUBJECTIVE:   Ms. Navarro states, \"it's thumping in my head, like a beat\"     Social/Functional   Lives With: Family  Type of Home: Mobile home  Home Layout: One level  Home Access: Stairs to enter with rails  Entrance Stairs - Number of Steps: 6  Entrance Stairs - Rails: Left  Bathroom Shower/Tub: 
Impaired (Comments):   AROM [x]     PROM []    Strength [x]     Balance [x]     Posture [] Forward Head  Rounded Shoulders   Sensation [x]     Coordination [x]      Tone [x]     Edema []    Activity Tolerance [] Patient Tolerated treatment well    []      COGNITION/  PERCEPTION: Intact Impaired (Comments):   Orientation [x]     Vision [x]     Hearing [x]     Cognition  [x]       MOBILITY: I Mod I S SBA CGA Min Mod Max Total  NT x2 Comments:   Bed Mobility    Rolling [x] [] [] [] [] [] [] [] [] [] [] Utilizing railing for assist   Supine to Sit [x] [] [] [] [] [] [] [] [] [] []    Scooting [x] [] [] [] [] [] [] [] [] [] []    Sit to Supine [x] [] [] [] [] [] [] [] [] [] []    Transfers    Sit to Stand [x] [] [] [] [] [] [] [] [] [] [] CGA maintained for safety   Bed to Chair [] [] [] [] [] [] [] [] [] [x] []    Stand to Sit [x] [] [] [] [] [] [] [] [] [] [] CGA maintained for safety    [] [] [] [] [] [] [] [] [] [] []    I=Independent, Mod I=Modified Independent, S=Supervision, SBA=Standby Assistance, CGA=Contact Guard Assistance,   Min=Minimal Assistance, Mod=Moderate Assistance, Max=Maximal Assistance, Total=Total Assistance, NT=Not Tested    GAIT: I Mod I S SBA CGA Min Mod Max Total  NT x2 Comments:   Level of Assistance [] [] [] [] [] [] [] [] [] [] []    Distance 5  feet    DME None    Gait Quality Slow    Weightbearing Status Restrictions/Precautions  Restrictions/Precautions: Fall Risk    Stairs Not performed     I=Independent, Mod I=Modified Independent, S=Supervision, SBA=Standby Assistance, CGA=Contact Guard Assistance,   Min=Minimal Assistance, Mod=Moderate Assistance, Max=Maximal Assistance, Total=Total Assistance, NT=Not Tested    PLAN:   FREQUENCY AND DURATION:   D/C from Acute PT    TREATMENT:   EVALUATION: LOW COMPLEXITY: (Untimed Charge)  The initial evaluation charge encompasses clinical chart review, objective assessment, interpretation of assessment, and skilled monitoring of the patient's

## 2024-08-09 ENCOUNTER — TELEPHONE (OUTPATIENT)
Dept: INTERNAL MEDICINE CLINIC | Facility: CLINIC | Age: 34
End: 2024-08-09

## 2024-08-09 NOTE — TELEPHONE ENCOUNTER
Spoke with patient regarding TCM care and PCP follow-up.  She is doing well since hospital discharge and plans to make a follow-up PCP appointment next week.

## 2024-08-11 LAB
BACTERIA SPEC CULT: NORMAL
BACTERIA SPEC CULT: NORMAL
SERVICE CMNT-IMP: NORMAL
SERVICE CMNT-IMP: NORMAL

## 2024-08-22 ENCOUNTER — APPOINTMENT (OUTPATIENT)
Dept: CT IMAGING | Age: 34
DRG: 720 | End: 2024-08-22
Payer: MEDICAID

## 2024-08-22 ENCOUNTER — HOSPITAL ENCOUNTER (EMERGENCY)
Age: 34
Discharge: ANOTHER HOSPITAL | DRG: 720 | End: 2024-08-22
Attending: EMERGENCY MEDICINE
Payer: MEDICAID

## 2024-08-22 VITALS
WEIGHT: 293 LBS | SYSTOLIC BLOOD PRESSURE: 130 MMHG | RESPIRATION RATE: 22 BRPM | HEART RATE: 103 BPM | OXYGEN SATURATION: 92 % | BODY MASS INDEX: 62 KG/M2 | TEMPERATURE: 100.5 F | DIASTOLIC BLOOD PRESSURE: 79 MMHG

## 2024-08-22 DIAGNOSIS — N12 PYELONEPHRITIS: Primary | ICD-10-CM

## 2024-08-22 PROBLEM — O09.93 SUPERVISION OF HIGH RISK PREGNANCY IN THIRD TRIMESTER: Status: ACTIVE | Noted: 2017-08-23

## 2024-08-22 PROBLEM — O23.42 GROUP B STREPTOCOCCUS URINARY TRACT INFECTION AFFECTING PREGNANCY IN SECOND TRIMESTER: Status: ACTIVE | Noted: 2017-12-26

## 2024-08-22 PROBLEM — B95.1 GROUP B STREPTOCOCCUS URINARY TRACT INFECTION AFFECTING PREGNANCY IN SECOND TRIMESTER: Status: ACTIVE | Noted: 2017-12-26

## 2024-08-22 PROBLEM — O99.019 ANEMIA AFFECTING FOURTH PREGNANCY: Status: ACTIVE | Noted: 2018-01-23

## 2024-08-22 PROBLEM — O10.919 CHRONIC HYPERTENSION AFFECTING PREGNANCY: Status: ACTIVE | Noted: 2017-08-23

## 2024-08-22 LAB
ALBUMIN SERPL-MCNC: 3.7 G/DL (ref 3.5–5)
ALBUMIN/GLOB SERPL: 0.8 (ref 0.4–1.6)
ALP SERPL-CCNC: 47 U/L (ref 45–117)
ALT SERPL-CCNC: <5 U/L (ref 13–61)
ANION GAP SERPL CALC-SCNC: 16 MMOL/L (ref 9–18)
APPEARANCE UR: CLEAR
AST SERPL-CCNC: 20 U/L (ref 15–37)
BACTERIA URNS QL MICRO: NORMAL /HPF
BASOPHILS # BLD: 0 K/UL (ref 0–0.2)
BASOPHILS NFR BLD: 0 % (ref 0–2)
BILIRUB SERPL-MCNC: 0.6 MG/DL (ref 0.2–1.1)
BILIRUB UR QL: NEGATIVE
BUN SERPL-MCNC: 10 MG/DL (ref 6–23)
CALCIUM SERPL-MCNC: 9.1 MG/DL (ref 8.3–10.4)
CHLORIDE SERPL-SCNC: 100 MMOL/L (ref 98–107)
CO2 SERPL-SCNC: 21 MMOL/L (ref 21–32)
COLOR UR: YELLOW
CREAT SERPL-MCNC: 0.94 MG/DL (ref 0.6–1)
DIFFERENTIAL METHOD BLD: ABNORMAL
EOSINOPHIL # BLD: 0 K/UL (ref 0–0.8)
EOSINOPHIL NFR BLD: 0 % (ref 0.5–7.8)
EPI CELLS #/AREA URNS HPF: NORMAL /HPF
ERYTHROCYTE [DISTWIDTH] IN BLOOD BY AUTOMATED COUNT: 16.8 % (ref 11.9–14.6)
GLOBULIN SER CALC-MCNC: 4.7 G/DL (ref 2.8–4.5)
GLUCOSE SERPL-MCNC: 85 MG/DL (ref 65–100)
GLUCOSE UR STRIP.AUTO-MCNC: NEGATIVE MG/DL
HCG SERPL QL: NEGATIVE
HCT VFR BLD AUTO: 34.8 % (ref 35.8–46.3)
HGB BLD-MCNC: 10.7 G/DL (ref 11.7–15.4)
HGB UR QL STRIP: ABNORMAL
IMM GRANULOCYTES # BLD AUTO: 0 K/UL (ref 0–0.5)
IMM GRANULOCYTES NFR BLD AUTO: 0 % (ref 0–5)
KETONES UR QL STRIP.AUTO: NEGATIVE MG/DL
LACTATE SERPL-SCNC: 1.8 MMOL/L (ref 0.5–2)
LEUKOCYTE ESTERASE UR QL STRIP.AUTO: ABNORMAL
LIPASE SERPL-CCNC: 14 U/L (ref 13–60)
LYMPHOCYTES # BLD: 1.9 K/UL (ref 0.5–4.6)
LYMPHOCYTES NFR BLD: 17 % (ref 13–44)
MCH RBC QN AUTO: 26.7 PG (ref 26.1–32.9)
MCHC RBC AUTO-ENTMCNC: 30.7 G/DL (ref 31.4–35)
MCV RBC AUTO: 86.8 FL (ref 82–102)
MONOCYTES # BLD: 0.2 K/UL (ref 0.1–1.3)
MONOCYTES NFR BLD: 2 % (ref 4–12)
MUCOUS THREADS URNS QL MICRO: 0 /LPF
NEUTS SEG # BLD: 8.8 K/UL (ref 1.7–8.2)
NEUTS SEG NFR BLD: 80 % (ref 43–78)
NITRITE UR QL STRIP.AUTO: NEGATIVE
NRBC # BLD: 0 K/UL (ref 0–0.2)
OTHER OBSERVATIONS: NORMAL
PH UR STRIP: 6.5 (ref 5–9)
PLATELET # BLD AUTO: 372 K/UL (ref 150–450)
PMV BLD AUTO: 9.8 FL (ref 9.4–12.3)
POTASSIUM SERPL-SCNC: 4.1 MMOL/L (ref 3.5–5.1)
PROCALCITONIN SERPL-MCNC: 0.07 NG/ML (ref 0–0.49)
PROT SERPL-MCNC: 8.4 G/DL (ref 6.4–8.2)
PROT UR STRIP-MCNC: NEGATIVE MG/DL
RBC # BLD AUTO: 4.01 M/UL (ref 4.05–5.2)
RBC #/AREA URNS HPF: NORMAL /HPF
SODIUM SERPL-SCNC: 137 MMOL/L (ref 133–143)
SP GR UR REFRACTOMETRY: 1.02 (ref 1–1.02)
UROBILINOGEN UR QL STRIP.AUTO: 0.2 EU/DL (ref 0.2–1)
WBC # BLD AUTO: 11.1 K/UL (ref 4.3–11.1)
WBC URNS QL MICRO: NORMAL /HPF

## 2024-08-22 PROCEDURE — 87186 SC STD MICRODIL/AGAR DIL: CPT

## 2024-08-22 PROCEDURE — 74177 CT ABD & PELVIS W/CONTRAST: CPT

## 2024-08-22 PROCEDURE — 83605 ASSAY OF LACTIC ACID: CPT

## 2024-08-22 PROCEDURE — 80053 COMPREHEN METABOLIC PANEL: CPT

## 2024-08-22 PROCEDURE — 96374 THER/PROPH/DIAG INJ IV PUSH: CPT

## 2024-08-22 PROCEDURE — 6360000002 HC RX W HCPCS

## 2024-08-22 PROCEDURE — 87040 BLOOD CULTURE FOR BACTERIA: CPT

## 2024-08-22 PROCEDURE — 85025 COMPLETE CBC W/AUTO DIFF WBC: CPT

## 2024-08-22 PROCEDURE — 83690 ASSAY OF LIPASE: CPT

## 2024-08-22 PROCEDURE — 81001 URINALYSIS AUTO W/SCOPE: CPT

## 2024-08-22 PROCEDURE — 87086 URINE CULTURE/COLONY COUNT: CPT

## 2024-08-22 PROCEDURE — 99285 EMERGENCY DEPT VISIT HI MDM: CPT

## 2024-08-22 PROCEDURE — 87088 URINE BACTERIA CULTURE: CPT

## 2024-08-22 PROCEDURE — 6370000000 HC RX 637 (ALT 250 FOR IP)

## 2024-08-22 PROCEDURE — 84145 PROCALCITONIN (PCT): CPT

## 2024-08-22 PROCEDURE — 84703 CHORIONIC GONADOTROPIN ASSAY: CPT

## 2024-08-22 PROCEDURE — 96375 TX/PRO/DX INJ NEW DRUG ADDON: CPT

## 2024-08-22 PROCEDURE — 2580000003 HC RX 258

## 2024-08-22 PROCEDURE — 6360000004 HC RX CONTRAST MEDICATION

## 2024-08-22 RX ORDER — HYDROMORPHONE HYDROCHLORIDE 1 MG/ML
0.5 INJECTION, SOLUTION INTRAMUSCULAR; INTRAVENOUS; SUBCUTANEOUS
Status: COMPLETED | OUTPATIENT
Start: 2024-08-22 | End: 2024-08-22

## 2024-08-22 RX ORDER — IOPAMIDOL 755 MG/ML
100 INJECTION, SOLUTION INTRAVASCULAR
Status: COMPLETED | OUTPATIENT
Start: 2024-08-22 | End: 2024-08-22

## 2024-08-22 RX ORDER — ACETAMINOPHEN 500 MG
1000 TABLET ORAL
Status: COMPLETED | OUTPATIENT
Start: 2024-08-22 | End: 2024-08-22

## 2024-08-22 RX ORDER — KETOROLAC TROMETHAMINE 30 MG/ML
30 INJECTION, SOLUTION INTRAMUSCULAR; INTRAVENOUS
Status: COMPLETED | OUTPATIENT
Start: 2024-08-22 | End: 2024-08-22

## 2024-08-22 RX ORDER — ONDANSETRON 2 MG/ML
4 INJECTION INTRAMUSCULAR; INTRAVENOUS
Status: COMPLETED | OUTPATIENT
Start: 2024-08-22 | End: 2024-08-22

## 2024-08-22 RX ORDER — PHENAZOPYRIDINE HYDROCHLORIDE 95 MG/1
95 TABLET ORAL
Status: COMPLETED | OUTPATIENT
Start: 2024-08-22 | End: 2024-08-22

## 2024-08-22 RX ORDER — 0.9 % SODIUM CHLORIDE 0.9 %
30 INTRAVENOUS SOLUTION INTRAVENOUS ONCE
Status: COMPLETED | OUTPATIENT
Start: 2024-08-22 | End: 2024-08-22

## 2024-08-22 RX ADMIN — WATER 1000 MG: 1 INJECTION INTRAMUSCULAR; INTRAVENOUS; SUBCUTANEOUS at 22:06

## 2024-08-22 RX ADMIN — ONDANSETRON 4 MG: 2 INJECTION INTRAMUSCULAR; INTRAVENOUS at 20:48

## 2024-08-22 RX ADMIN — URINARY PAIN RELIEF 95 MG: 95 TABLET ORAL at 22:06

## 2024-08-22 RX ADMIN — ACETAMINOPHEN 1000 MG: 500 TABLET ORAL at 22:24

## 2024-08-22 RX ADMIN — SODIUM CHLORIDE 1572 ML: 9 INJECTION, SOLUTION INTRAVENOUS at 19:55

## 2024-08-22 RX ADMIN — IOPAMIDOL 100 ML: 755 INJECTION, SOLUTION INTRAVENOUS at 21:36

## 2024-08-22 RX ADMIN — KETOROLAC TROMETHAMINE 30 MG: 30 INJECTION, SOLUTION INTRAMUSCULAR at 20:48

## 2024-08-22 RX ADMIN — HYDROMORPHONE HYDROCHLORIDE 0.5 MG: 1 INJECTION, SOLUTION INTRAMUSCULAR; INTRAVENOUS; SUBCUTANEOUS at 20:48

## 2024-08-22 ASSESSMENT — PAIN SCALES - GENERAL
PAINLEVEL_OUTOF10: 8
PAINLEVEL_OUTOF10: 6

## 2024-08-22 NOTE — ED PROVIDER NOTES
Emergency Department Provider Note       PCP: Catalina Guajardo APRN - NP   Age: 34 y.o.   Sex: female     DISPOSITION Decision To Transfer 08/22/2024 10:18:33 PM  Condition at Disposition: Stable       ICD-10-CM    1. Pyelonephritis  N12           Medical Decision Making     Vital signs reviewed, patient stable, NAD, afebrile, nontoxic in appearance     Heart rate 93 on initial presentation, O2 saturation 100% on room air    34-year-old female presents to the emergency department today for bilateral flank pain,, nausea, vomiting, myalgias, pelvic pain and dysuria.  Patient states that this feels just like when she was septic for UTI back 8//24.  States she finished her course of Cipro.    On exam she is tender over the pelvic region without point tenderness to the lateral aspects of the pelvic region.  She is shaking.  Lungs are clear bilaterally.  There is some mild bilateral flank pain.    Will obtain a sepsis workup  Check a urine hCG  Patient states he does not know she can give a urine sample at this time.  I will get a qualitative hCG  Will administer 30 cc/kg ideal body weight as patient's BMI is 62    Due to tenderness to palpation over pelvic region will obtain CT abdomen pelvis with IV contrast  Differential includes but is not limited to cystitis, ureterolithiasis, colitis, pyelonephritis, enteritis    Will administer Dilaudid, Toradol, Zofran once we have a negative hCG    hCG is negative    Pain is improved with medications here    CBC without leukocytosis or leukopenia.  There is anemia with a hemoglobin of 10.7 this is an improvement from lab work 2 weeks ago  No thrombocytopenia there is a left shift  CMP without significant findings.  See ED course for comments on those labs  Lipase within normal limits at 14  Pro-Ricci within normal limits at 0.07, lactic acid 1.8  Urinalysis consistent with cystitis.  I have sent urine for culture.  Obtain blood cultures and administered IV Rocephin    CT abdomen  pelvis with IV contrast negative for masses or infiltrates in lower lung bases.  Liver is normal in size and appearance.  No gallstones or bile duct dilation.  Pancreas spleen and adrenals appear normal.  There is striated nephrogram appearance of bilateral renal parenchyma left greater than right, bladder appears normal.  No pelvic masses.  Bowel is normal in caliber without inflammatory changes.  No significant retroperitoneal mesenteric or pelvic adenopathy.  No ascites.    Patient's heart rate up to 105.  I went and rechecked temperature she now has a temperature of 100.5.  1 g of Tylenol ordered    Based on history, physical exam, work-up today in the emergency department, I do believe patient needs admission to the hospital for management of pyelonephritis.  No sepsis at this time however concern for rapidly worsening of condition.  I discussed work-up here in the emergency department with the patient and answered any questions.  Discussed the need for admission and they are agreeable to this.     Discussed this patient with ER attending, Dr. Espinal as I do believe patient will need admission for pyelonephritis    Contacted hospitalist, Dr. Finley, at [facility] regarding admission.  They have agreed to accept patient.    Patient will be transferred to [facility] via EMS.  Currently stable at this time.  ED Course as of 08/22/24 2223   Thu Aug 22, 2024   2041 Preg, Serum: Negative [JG]   2117 Leukocyte Esterase, Urine(!): MODERATE [JG]   2117 Blood, Urine(!): MODERATE [JG]   2117 Procalcitonin: 0.07 [JG]   2117 Preg, Serum: Negative [JG]   2117 Lipase: 14 [JG]   2117 No CBC without leukocytosis or leukopenia, hemoglobin is 10.7 which is increased from 2 weeks ago.  No thrombocytopenia [JG]   2117 CMP without significant electrolyte derangements, no sign of ROSHNI, no elevation of bilirubin ALT AST or alk phos [JG]   2118 Lactic acid is still in process from 1938 [JG]   2139 WBC, UA: 20-50 [JG]  or Ex-Partner: Not asked     Emotionally Abused: Not asked     Physically Abused: Not asked     Sexually Abused: Not asked   Housing Stability: Low Risk  (8/4/2024)    Housing Stability Vital Sign     Unable to Pay for Housing in the Last Year: No     Number of Places Lived in the Last Year: 1     Unstable Housing in the Last Year: No        Previous Medications    IBUPROFEN (ADVIL;MOTRIN) 600 MG TABLET    Take 1 tablet by mouth every 6 hours as needed for Fever    MAGNESIUM OXIDE (MAG-OX) 400 (240 MG) MG TABLET    Take 1 tablet by mouth 2 times daily for 4 days    NALOXONE 4 MG/0.1ML LIQD NASAL SPRAY    1 spray by Nasal route as needed for Opioid Reversal    POTASSIUM CHLORIDE (KLOR-CON M) 20 MEQ EXTENDED RELEASE TABLET    Take 1 tablet by mouth daily for 4 days        Results for orders placed or performed during the hospital encounter of 08/22/24   CT ABDOMEN PELVIS W IV CONTRAST Additional Contrast? None    Narrative    CT OF THE ABDOMEN AND PELVIS    INDICATION: Severe bilateral lower abdominal pain/pelvic pain beginning  yesterday recent UTI sepsis    TECHNIQUE: Multiple 2D axial images were obtained through the abdomen and  pelvis.  Oral contrast was used for bowel opacification.  100mL of Isovue 370  intravenous contrast was used for better evaluation of solid organs and vascular  structures.  Radiation dose reduction techniques were used for this study.  All  CT scans performed at this facility use one or all of the following: Automated  exposure control, adjustment of the mA and/or kVp according to patient's size,  iterative reconstruction.    COMPARISON: 8/4/2024    FINDINGS:  - LUNG BASES: No infiltrates or masses.    - LIVER: Normal in size and appearance.    - GALLBLADDER/BILE DUCTS: No gallstones or bile duct dilatation.  - PANCREAS: Normal.  - SPLEEN: Normal.    - ADRENALS: Normal.  - KIDNEYS/URETERS: Striated nephrogram appearance of the bilateral renal  parenchyma, left greater than right..  -

## 2024-08-22 NOTE — ED TRIAGE NOTES
Pt was seen few weeks ago for septic UTI. Pt state she was discharged and took all of her medications but now she is feeling worse with chills, lower abdominal pain, nausea, vomiting, and pain when she goes. Pt states it feels like she is on a bad period with pain and pressure.

## 2024-08-23 ENCOUNTER — HOSPITAL ENCOUNTER (INPATIENT)
Age: 34
LOS: 4 days | Discharge: HOME | DRG: 720 | End: 2024-08-27
Attending: FAMILY MEDICINE | Admitting: HOSPITALIST
Payer: MEDICAID

## 2024-08-23 DIAGNOSIS — N39.0 RECURRENT URINARY TRACT INFECTION: Primary | ICD-10-CM

## 2024-08-23 PROBLEM — N10 ACUTE PYELONEPHRITIS: Status: ACTIVE | Noted: 2024-08-23

## 2024-08-23 PROCEDURE — 6360000002 HC RX W HCPCS: Performed by: HOSPITALIST

## 2024-08-23 PROCEDURE — 2580000003 HC RX 258

## 2024-08-23 PROCEDURE — 2060000000 HC ICU INTERMEDIATE R&B

## 2024-08-23 PROCEDURE — 2580000003 HC RX 258: Performed by: HOSPITALIST

## 2024-08-23 PROCEDURE — 6360000002 HC RX W HCPCS

## 2024-08-23 RX ORDER — KETOROLAC TROMETHAMINE 30 MG/ML
30 INJECTION, SOLUTION INTRAMUSCULAR; INTRAVENOUS EVERY 6 HOURS PRN
Status: DISPENSED | OUTPATIENT
Start: 2024-08-23 | End: 2024-08-24

## 2024-08-23 RX ORDER — ONDANSETRON 2 MG/ML
4 INJECTION INTRAMUSCULAR; INTRAVENOUS EVERY 6 HOURS PRN
Status: DISCONTINUED | OUTPATIENT
Start: 2024-08-23 | End: 2024-08-27 | Stop reason: HOSPADM

## 2024-08-23 RX ORDER — POTASSIUM CHLORIDE 1500 MG/1
40 TABLET, EXTENDED RELEASE ORAL PRN
Status: DISCONTINUED | OUTPATIENT
Start: 2024-08-23 | End: 2024-08-27 | Stop reason: HOSPADM

## 2024-08-23 RX ORDER — ONDANSETRON 4 MG/1
4 TABLET, ORALLY DISINTEGRATING ORAL EVERY 8 HOURS PRN
Status: DISCONTINUED | OUTPATIENT
Start: 2024-08-23 | End: 2024-08-27 | Stop reason: HOSPADM

## 2024-08-23 RX ORDER — MAGNESIUM SULFATE IN WATER 40 MG/ML
2000 INJECTION, SOLUTION INTRAVENOUS PRN
Status: DISCONTINUED | OUTPATIENT
Start: 2024-08-23 | End: 2024-08-27 | Stop reason: HOSPADM

## 2024-08-23 RX ORDER — SODIUM CHLORIDE 9 MG/ML
INJECTION, SOLUTION INTRAVENOUS PRN
Status: DISCONTINUED | OUTPATIENT
Start: 2024-08-23 | End: 2024-08-27 | Stop reason: HOSPADM

## 2024-08-23 RX ORDER — OXYCODONE HYDROCHLORIDE 5 MG/1
5 TABLET ORAL EVERY 4 HOURS PRN
Status: DISCONTINUED | OUTPATIENT
Start: 2024-08-23 | End: 2024-08-27 | Stop reason: HOSPADM

## 2024-08-23 RX ORDER — POTASSIUM CHLORIDE 7.45 MG/ML
10 INJECTION INTRAVENOUS PRN
Status: DISCONTINUED | OUTPATIENT
Start: 2024-08-23 | End: 2024-08-27 | Stop reason: HOSPADM

## 2024-08-23 RX ORDER — ACETAMINOPHEN 650 MG/1
650 SUPPOSITORY RECTAL EVERY 6 HOURS PRN
Status: DISCONTINUED | OUTPATIENT
Start: 2024-08-23 | End: 2024-08-27 | Stop reason: HOSPADM

## 2024-08-23 RX ORDER — POLYETHYLENE GLYCOL 3350 17 G/17G
17 POWDER, FOR SOLUTION ORAL DAILY PRN
Status: DISCONTINUED | OUTPATIENT
Start: 2024-08-23 | End: 2024-08-27 | Stop reason: HOSPADM

## 2024-08-23 RX ORDER — SODIUM CHLORIDE 0.9 % (FLUSH) 0.9 %
5-40 SYRINGE (ML) INJECTION EVERY 12 HOURS SCHEDULED
Status: DISCONTINUED | OUTPATIENT
Start: 2024-08-23 | End: 2024-08-27 | Stop reason: HOSPADM

## 2024-08-23 RX ORDER — ACETAMINOPHEN 325 MG/1
650 TABLET ORAL EVERY 6 HOURS PRN
Status: DISCONTINUED | OUTPATIENT
Start: 2024-08-23 | End: 2024-08-27 | Stop reason: HOSPADM

## 2024-08-23 RX ORDER — SODIUM CHLORIDE 0.9 % (FLUSH) 0.9 %
5-40 SYRINGE (ML) INJECTION PRN
Status: DISCONTINUED | OUTPATIENT
Start: 2024-08-23 | End: 2024-08-27 | Stop reason: HOSPADM

## 2024-08-23 RX ORDER — ENOXAPARIN SODIUM 100 MG/ML
40 INJECTION SUBCUTANEOUS 2 TIMES DAILY
Status: DISCONTINUED | OUTPATIENT
Start: 2024-08-23 | End: 2024-08-27 | Stop reason: HOSPADM

## 2024-08-23 RX ADMIN — KETOROLAC TROMETHAMINE 30 MG: 30 INJECTION, SOLUTION INTRAMUSCULAR at 16:31

## 2024-08-23 RX ADMIN — CEFTRIAXONE SODIUM 2000 MG: 2 INJECTION, POWDER, FOR SOLUTION INTRAMUSCULAR; INTRAVENOUS at 15:24

## 2024-08-23 RX ADMIN — KETOROLAC TROMETHAMINE 30 MG: 30 INJECTION, SOLUTION INTRAMUSCULAR at 21:47

## 2024-08-23 RX ADMIN — CEFEPIME 2000 MG: 2 INJECTION, POWDER, FOR SOLUTION INTRAVENOUS at 03:02

## 2024-08-23 RX ADMIN — ENOXAPARIN SODIUM 40 MG: 100 INJECTION SUBCUTANEOUS at 19:56

## 2024-08-23 RX ADMIN — SODIUM CHLORIDE, PRESERVATIVE FREE 10 ML: 5 INJECTION INTRAVENOUS at 19:57

## 2024-08-23 ASSESSMENT — PAIN DESCRIPTION - DESCRIPTORS
DESCRIPTORS: ACHING
DESCRIPTORS: ACHING

## 2024-08-23 ASSESSMENT — PAIN - FUNCTIONAL ASSESSMENT
PAIN_FUNCTIONAL_ASSESSMENT: PREVENTS OR INTERFERES SOME ACTIVE ACTIVITIES AND ADLS
PAIN_FUNCTIONAL_ASSESSMENT: ACTIVITIES ARE NOT PREVENTED

## 2024-08-23 ASSESSMENT — PAIN DESCRIPTION - ONSET: ONSET: GRADUAL

## 2024-08-23 ASSESSMENT — PAIN SCALES - GENERAL
PAINLEVEL_OUTOF10: 3
PAINLEVEL_OUTOF10: 0
PAINLEVEL_OUTOF10: 1
PAINLEVEL_OUTOF10: 0
PAINLEVEL_OUTOF10: 0
PAINLEVEL_OUTOF10: 3

## 2024-08-23 ASSESSMENT — PAIN DESCRIPTION - FREQUENCY: FREQUENCY: INTERMITTENT

## 2024-08-23 ASSESSMENT — PAIN DESCRIPTION - ORIENTATION: ORIENTATION: MID

## 2024-08-23 ASSESSMENT — PAIN DESCRIPTION - PAIN TYPE: TYPE: ACUTE PAIN

## 2024-08-23 ASSESSMENT — PAIN DESCRIPTION - LOCATION
LOCATION: ABDOMEN;HEAD
LOCATION: ABDOMEN

## 2024-08-23 NOTE — PROGRESS NOTES
TRANSFER - IN REPORT:    Verbal report received from meghann chery on Tamar Navarro  being received from [facility] ed for routine progression of patient care      Report consisted of patient's Situation, Background, Assessment and   Recommendations(SBAR).     Information from the following report(s) Nurse Handoff Report, Index, ED Encounter Summary, Adult Overview, Intake/Output, MAR, and Recent Results was reviewed with the receiving nurse.    Opportunity for questions and clarification was provided.      Assessment completed upon patient's arrival to unit and care assumed.

## 2024-08-23 NOTE — PROGRESS NOTES
4 Eyes Skin Assessment     NAME:  Tamar Navarro  YOB: 1990  MEDICAL RECORD NUMBER:  [id number]    The patient is being assessed for  Admission    I agree that at least one RN has performed a thorough Head to Toe Skin Assessment on the patient. ALL assessment sites listed below have been assessed.      Areas assessed by both nurses:    Head, Face, Ears, Shoulders, Back, Chest, Arms, Elbows, Hands, Sacrum. Buttock, Coccyx, Ischium, Legs. Feet and Heels, and Under Medical Devices         Does the Patient have a Wound? No noted wound(s)       Yuri Prevention initiated by RN: Yes  Wound Care Orders initiated by RN: No    Pressure Injury (Stage 3,4, Unstageable, DTI, NWPT, and Complex wounds) if present, place Wound referral order by RN under : Yes    New Ostomies, if present place, Ostomy referral order under : No     Nurse 1 eSignature: Electronically signed by Alex Wen RN on 8/23/24 at 1:08 AM EDT    **SHARE this note so that the co-signing nurse can place an eSignature**    Nurse 2 eSignature: Electronically signed by Alex Wen RN on 8/23/24 at 1:08 AM EDT

## 2024-08-23 NOTE — ED NOTES
TRANSFER - OUT REPORT:    Verbal report given to Milana THURSTON on Tamar Navarro  being transferred to [facility] room 609 for routine progression of patient care       Report consisted of patient's Situation, Background, Assessment and   Recommendations(SBAR).     Information from the following report(s) Nurse Handoff Report, ED Encounter Summary, ED SBAR, MAR, and Recent Results was reviewed with the receiving nurse.    Kinder Fall Assessment:                           Lines:   Peripheral IV 08/22/24 Left Antecubital (Active)   Site Assessment Clean, dry & intact 08/22/24 1935   Line Status Blood return noted 08/22/24 1935   Phlebitis Assessment No symptoms 08/22/24 1935   Infiltration Assessment 0 08/22/24 1935   Alcohol Cap Used No 08/22/24 1935   Dressing Status New dressing applied 08/22/24 1935   Dressing Type Transparent 08/22/24 1935   Dressing Intervention New 08/22/24 1935        Opportunity for questions and clarification was provided.      Patient transported with:  [employer]

## 2024-08-23 NOTE — ED NOTES
Patient room changed     TRANSFER - OUT REPORT:    Verbal report given to Reynold THURSTON on Tamar Navarro  being transferred to [facility] ED room 30 for routine progression of patient care       Report consisted of patient's Situation, Background, Assessment and   Recommendations(SBAR).     Information from the following report(s) Nurse Handoff Report, ED Encounter Summary, ED SBAR, Adult Overview, MAR, and Recent Results was reviewed with the receiving nurse.    Kinder Fall Assessment:                           Lines:   Peripheral IV 08/22/24 Left Antecubital (Active)   Site Assessment Clean, dry & intact 08/22/24 1935   Line Status Blood return noted 08/22/24 1935   Phlebitis Assessment No symptoms 08/22/24 1935   Infiltration Assessment 0 08/22/24 1935   Alcohol Cap Used No 08/22/24 1935   Dressing Status New dressing applied 08/22/24 1935   Dressing Type Transparent 08/22/24 1935   Dressing Intervention New 08/22/24 1935        Opportunity for questions and clarification was provided.      Patient transported with:  Yammer

## 2024-08-23 NOTE — PROGRESS NOTES
Hospitalist Progress Note   Admit Date:  2024 12:52 AM   Name:  Tamar Navarro   Age:  34 y.o.  Sex:  female  :  1990   MRN:  [id number]   Room:  [place] [address]    Presenting/Chief Complaint: No chief complaint on file.     Reason(s) for Admission: Acute pyelonephritis [N10]     Hospital Course:   Tamar Navarro is a 34 y.o. female with medical history of recurrent UTIs and pyelonephritis, morbid obesity who presented withbilateral flank pain, lower abdominal pain started yesterday which continued to get worse. Patient reports she was recently diagnosed with UTI and was treated completed antibiotics started feeling well since discharge but started experiencing pain so presented to ER for further evaluation.  Previous UTIs positive for pansensitive E. coli.  CT abdomen pelvis ED indicated recurrent acute pyelonephritis.  Patient ship admitted for further management      Subjective & 24hr Events:   Doing well.  Patient denies any fevers or chills since being admitted to the hospital.  Tolerating p.o. intake.  Patient unclear of triggering cause of her urinary tract infection and denies any postcoital relationship.  Believes it to be secondary to voluntary urinary retention.      Assessment & Plan:     Acute pyelonephritis  Positive urinalysis with symptoms and CT scan findings.  Microbial history indicates pansensitive E. coli.  Currently on cefepime with blood cultures/urine culture pending.  Plan to de-escalate antibiotics in the next 24 hours and continue to follow culture data  -Switch to ceftriaxone 2 g every 24 hours  -Follow-up blood and urine cultures  -Monitor hemodynamics  -Stress no longer voluntarily retain urine    Obesity  -Advocate for weight loss    Anticipated Discharge Arrangements:   Home    PT/OT evals ordered?  Not ordered; patient not expected to need rehab  Diet:  ADULT DIET; Regular  VTE prophylaxis: Lovenox  Code status: Full Code      Non-peripheral Lines and Tubes (if

## 2024-08-23 NOTE — H&P
Hospitalist History and Physical   Admit Date:  2024 12:52 AM   Name:  Tamar Navarro   Age:  34 y.o.  Sex:  female  :  1990   MRN:  [id number]   Room:  [place] [address]    Presenting/Chief Complaint: Transferred from [place] emergency room with acute pyelonephritis.     Reason(s) for Admission: Acute pyelonephritis [N10]     History of Present Illness:     33 years old female with history of morbid obesity, obstructive sleep apnea, multiple UTIs for past few years with chief complaint of bilateral flank pain, lower abdominal pain started yesterday which continued to get worse.  Patient reports she was recently diagnosed with UTI and was treated completed antibiotics started feeling well since discharge but started experiencing pain so presented to ER for further evaluation.  History of burning urination, nausea, vomiting, body aches, feeling unwell overall since last night.  Presented to emergency room.  In ER CT scan of abdomen was obtained which shows findings consistent with acute pyelonephritis left greater than right.  Urinalysis shows evidence of UTI.  ER physician requested transfer of this patient to [facility] [place].  Patient was seen initially in [facility] ER.              Assessment & Plan:     Acute pyelonephritis: Past urine culture shows E. coli which is sensitive to cephalosporins, initiated on cefepime, follow cultures.    Abdominal pain: Likely secondary to acute pyelonephritis, will provide pain medications.    Obesity: Weight loss recommended.    Diet: ADULT DIET; Regular  VTE prophylaxis: SCD's   Code status: Full Code      Non-peripheral Lines and Tubes (if present):     External Urinary Catheter (Active)            Hospital Problems:  Principal Problem:    Acute pyelonephritis  Resolved Problems:    * No resolved hospital problems. *        Objective:   Patient Vitals for the past 24 hrs:   Temp Pulse Resp BP SpO2   24 0124 98.4 °F (36.9 °C) 90 14 135/67 93 %  techniques were used for this study.  All CT scans performed at this facility use one or all of the following: Automated exposure control, adjustment of the mA and/or kVp according to patient's size, iterative reconstruction. COMPARISON: 8/4/2024 FINDINGS: - LUNG BASES: No infiltrates or masses. - LIVER: Normal in size and appearance.  - GALLBLADDER/BILE DUCTS: No gallstones or bile duct dilatation. - PANCREAS: Normal. - SPLEEN: Normal. - ADRENALS: Normal. - KIDNEYS/URETERS: Striated nephrogram appearance of the bilateral renal parenchyma, left greater than right.. - BLADDER: Normal. - REPRODUCTIVE ORGANS: No pelvic masses. - BOWEL: Normal caliber.  No inflammatory changes. - LYMPH NODES: No significant retroperitoneal, mesenteric, or pelvic adenopathy. - BONES: No fracture or significant bone lesion. - VASCULATURE: Normal - OTHER: No ascites.     Findings consistent with pyelonephritis, left greater than right. This finding is not significantly changed compared to the previous exam. If providers have any questions about this report, I can be reached on [employer]. Electronically signed by Sneha Jernigan        Signed:  Don Felix MD    Part of this note may have been written by using a voice dictation software.  The note has been proof read but may still contain some grammatical/other typographical errors.

## 2024-08-24 PROBLEM — G47.33 OSA (OBSTRUCTIVE SLEEP APNEA): Status: ACTIVE | Noted: 2024-08-24

## 2024-08-24 LAB
ALBUMIN SERPL-MCNC: 2.4 G/DL (ref 3.5–5)
ALBUMIN/GLOB SERPL: 0.5 (ref 1–1.9)
ALP SERPL-CCNC: 39 U/L (ref 35–104)
ALT SERPL-CCNC: <5 U/L (ref 12–65)
ANION GAP SERPL CALC-SCNC: 9 MMOL/L (ref 9–18)
AST SERPL-CCNC: 13 U/L (ref 15–37)
BASOPHILS # BLD: 0 K/UL (ref 0–0.2)
BASOPHILS NFR BLD: 0 % (ref 0–2)
BILIRUB SERPL-MCNC: 0.6 MG/DL (ref 0–1.2)
BUN SERPL-MCNC: 10 MG/DL (ref 6–23)
CALCIUM SERPL-MCNC: 8 MG/DL (ref 8.8–10.2)
CHLORIDE SERPL-SCNC: 102 MMOL/L (ref 98–107)
CO2 SERPL-SCNC: 24 MMOL/L (ref 20–28)
CREAT SERPL-MCNC: 0.93 MG/DL (ref 0.6–1.1)
DIFFERENTIAL METHOD BLD: ABNORMAL
EOSINOPHIL # BLD: 0 K/UL (ref 0–0.8)
EOSINOPHIL NFR BLD: 0 % (ref 0.5–7.8)
ERYTHROCYTE [DISTWIDTH] IN BLOOD BY AUTOMATED COUNT: 16.8 % (ref 11.9–14.6)
GLOBULIN SER CALC-MCNC: 4.6 G/DL (ref 2.3–3.5)
GLUCOSE SERPL-MCNC: 103 MG/DL (ref 70–99)
HCT VFR BLD AUTO: 30.4 % (ref 35.8–46.3)
HGB BLD-MCNC: 9.1 G/DL (ref 11.7–15.4)
IMM GRANULOCYTES # BLD AUTO: 0 K/UL (ref 0–0.5)
IMM GRANULOCYTES NFR BLD AUTO: 0 % (ref 0–5)
LYMPHOCYTES # BLD: 2.3 K/UL (ref 0.5–4.6)
LYMPHOCYTES NFR BLD: 19 % (ref 13–44)
MAGNESIUM SERPL-MCNC: 1.8 MG/DL (ref 1.8–2.4)
MCH RBC QN AUTO: 26.5 PG (ref 26.1–32.9)
MCHC RBC AUTO-ENTMCNC: 29.9 G/DL (ref 31.4–35)
MCV RBC AUTO: 88.4 FL (ref 82–102)
MONOCYTES # BLD: 1 K/UL (ref 0.1–1.3)
MONOCYTES NFR BLD: 8 % (ref 4–12)
NEUTS SEG # BLD: 8.9 K/UL (ref 1.7–8.2)
NEUTS SEG NFR BLD: 73 % (ref 43–78)
NRBC # BLD: 0 K/UL (ref 0–0.2)
PLATELET # BLD AUTO: 282 K/UL (ref 150–450)
PMV BLD AUTO: 10.1 FL (ref 9.4–12.3)
POTASSIUM SERPL-SCNC: 3.5 MMOL/L (ref 3.5–5.1)
PROT SERPL-MCNC: 7.1 G/DL (ref 6.3–8.2)
RBC # BLD AUTO: 3.44 M/UL (ref 4.05–5.2)
SODIUM SERPL-SCNC: 135 MMOL/L (ref 136–145)
WBC # BLD AUTO: 12.3 K/UL (ref 4.3–11.1)

## 2024-08-24 PROCEDURE — 2580000003 HC RX 258: Performed by: HOSPITALIST

## 2024-08-24 PROCEDURE — 85025 COMPLETE CBC W/AUTO DIFF WBC: CPT

## 2024-08-24 PROCEDURE — 1100000000 HC RM PRIVATE

## 2024-08-24 PROCEDURE — 2580000003 HC RX 258

## 2024-08-24 PROCEDURE — 6370000000 HC RX 637 (ALT 250 FOR IP): Performed by: HOSPITALIST

## 2024-08-24 PROCEDURE — 83735 ASSAY OF MAGNESIUM: CPT

## 2024-08-24 PROCEDURE — 6360000002 HC RX W HCPCS

## 2024-08-24 PROCEDURE — 76937 US GUIDE VASCULAR ACCESS: CPT

## 2024-08-24 PROCEDURE — 02HV33Z INSERTION OF INFUSION DEVICE INTO SUPERIOR VENA CAVA, PERCUTANEOUS APPROACH: ICD-10-PCS | Performed by: HOSPITALIST

## 2024-08-24 PROCEDURE — 6360000002 HC RX W HCPCS: Performed by: HOSPITALIST

## 2024-08-24 PROCEDURE — 80053 COMPREHEN METABOLIC PANEL: CPT

## 2024-08-24 PROCEDURE — 36415 COLL VENOUS BLD VENIPUNCTURE: CPT

## 2024-08-24 RX ADMIN — ACETAMINOPHEN 650 MG: 325 TABLET ORAL at 08:47

## 2024-08-24 RX ADMIN — ACETAMINOPHEN 650 MG: 325 TABLET ORAL at 20:34

## 2024-08-24 RX ADMIN — SODIUM CHLORIDE: 9 INJECTION, SOLUTION INTRAVENOUS at 20:42

## 2024-08-24 RX ADMIN — CEFEPIME 2000 MG: 2 INJECTION, POWDER, FOR SOLUTION INTRAVENOUS at 20:43

## 2024-08-24 RX ADMIN — SODIUM CHLORIDE, PRESERVATIVE FREE 10 ML: 5 INJECTION INTRAVENOUS at 20:40

## 2024-08-24 RX ADMIN — SODIUM CHLORIDE, PRESERVATIVE FREE 10 ML: 5 INJECTION INTRAVENOUS at 08:49

## 2024-08-24 RX ADMIN — CEFEPIME 2000 MG: 2 INJECTION, POWDER, FOR SOLUTION INTRAVENOUS at 08:47

## 2024-08-24 RX ADMIN — ENOXAPARIN SODIUM 40 MG: 100 INJECTION SUBCUTANEOUS at 20:43

## 2024-08-24 RX ADMIN — ENOXAPARIN SODIUM 40 MG: 100 INJECTION SUBCUTANEOUS at 08:47

## 2024-08-24 RX ADMIN — OXYCODONE HYDROCHLORIDE 5 MG: 5 TABLET ORAL at 19:06

## 2024-08-24 ASSESSMENT — PAIN SCALES - GENERAL
PAINLEVEL_OUTOF10: 8
PAINLEVEL_OUTOF10: 7

## 2024-08-24 ASSESSMENT — PAIN DESCRIPTION - ORIENTATION: ORIENTATION: RIGHT;LEFT

## 2024-08-24 ASSESSMENT — PAIN DESCRIPTION - DESCRIPTORS
DESCRIPTORS: ACHING
DESCRIPTORS: DISCOMFORT

## 2024-08-24 ASSESSMENT — PAIN DESCRIPTION - LOCATION
LOCATION: ABDOMEN
LOCATION: ABDOMEN;HEAD

## 2024-08-24 NOTE — PROGRESS NOTES
Hospitalist Progress Note   Admit Date:  2024 12:52 AM   Name:  Tamar Navarro   Age:  34 y.o.  Sex:  female  :  1990   MRN:  [id number]   Room:  [place] [address]    Presenting/Chief Complaint: No chief complaint on file.     Reason(s) for Admission: Acute pyelonephritis [N10]     Hospital Course:   Tamar Navarro is a 34 y.o. female with medical history of recurrent UTIs and pyelonephritis, morbid obesity who presented withbilateral flank pain, lower abdominal pain started yesterday which continued to get worse. Patient reports she was recently diagnosed with UTI and was treated completed antibiotics started feeling well since discharge but started experiencing pain so presented to ER for further evaluation.  Previous UTIs positive for pansensitive E. coli.  CT abdomen pelvis ED indicated recurrent acute pyelonephritis.  Patient ship admitted for further management.    Febrile again overnight between  and .  Cefepime reinitiated.  Urine cultures positive for GNR's      Subjective & 24hr Events:   Endorses fever overnight.  Continues to endorse abdominal pain.  Feels some clinical improvements but expressed her desire to remain hospitalized until infection is significantly treated.      Assessment & Plan:     Acute pyelonephritis  Positive urinalysis with symptoms and CT scan findings.  Current urine culture growing GNR's.  Microbial history indicates pansensitive E. coli.    -Transition back to cefepime 2 g every 8 hours  -Follow-up blood and urine cultures  -Monitor hemodynamics  -Stress no longer voluntarily retain urine    Obesity  -Advocate for weight loss    EMILIE  -Reports diagnosed history but has no CPAP machine at home.  Encourage patient to reestablish care with sleep medicine    Anticipated Discharge Arrangements:   Home    PT/OT evals ordered?  Not ordered; patient not expected to need rehab  Diet:  ADULT DIET; Regular  VTE prophylaxis: Lovenox  Code status: Full  - 0.2 K/uL    Differential Type AUTOMATED      Neutrophils % 80 (H) 43 - 78 %    Lymphocytes % 17 13 - 44 %    Monocytes % 2 (L) 4.0 - 12.0 %    Eosinophils % 0 (L) 0.5 - 7.8 %    Basophils % 0 0.0 - 2.0 %    Immature Granulocytes % 0 0.0 - 5.0 %    Neutrophils Absolute 8.8 (H) 1.7 - 8.2 K/UL    Lymphocytes Absolute 1.9 0.5 - 4.6 K/UL    Monocytes Absolute 0.2 0.1 - 1.3 K/UL    Eosinophils Absolute 0.0 0.0 - 0.8 K/UL    Basophils Absolute 0.0 0.0 - 0.2 K/UL    Immature Granulocytes Absolute 0.0 0.0 - 0.5 K/UL   CMP    Collection Time: 08/22/24  7:38 PM   Result Value Ref Range    Sodium 137 133 - 143 mmol/L    Potassium 4.1 3.5 - 5.1 mmol/L    Chloride 100 98 - 107 mmol/L    CO2 21 21 - 32 mmol/L    Anion Gap 16 9 - 18 mmol/L    Glucose 85 65 - 100 mg/dL    BUN 10 6 - 23 MG/DL    Creatinine 0.94 0.6 - 1.0 MG/DL    Est, Glom Filt Rate 82 >60 ml/min/1.73m2    Calcium 9.1 8.3 - 10.4 MG/DL    Total Bilirubin 0.6 0.2 - 1.1 MG/DL    ALT <5 (L) 13.0 - 61.0 U/L    AST 20 15 - 37 U/L    Alk Phosphatase 47 45.0 - 117.0 U/L    Total Protein 8.4 (H) 6.4 - 8.2 g/dL    Albumin 3.7 3.5 - 5.0 g/dL    Globulin 4.7 (H) 2.8 - 4.5 g/dL    Albumin/Globulin Ratio 0.8 0.4 - 1.6     Lipase    Collection Time: 08/22/24  7:38 PM   Result Value Ref Range    Lipase 14 13 - 60 U/L   Lactate, Sepsis    Collection Time: 08/22/24  7:38 PM   Result Value Ref Range    Lactic Acid, Sepsis 1.8 0.5 - 2.0 MMOL/L   Procalcitonin    Collection Time: 08/22/24  7:38 PM   Result Value Ref Range    Procalcitonin 0.07 0.00 - 0.49 ng/mL   HCG Qualitative, Serum    Collection Time: 08/22/24  7:38 PM   Result Value Ref Range    Preg, Serum Negative     Urinalysis w rflx microscopic    Collection Time: 08/22/24  7:42 PM   Result Value Ref Range    Color, UA YELLOW      Appearance CLEAR      Specific Gravity, UA 1.020 1.001 - 1.023      pH, Urine 6.5 5.0 - 9.0      Protein, UA Negative NEG mg/dL    Glucose, Ur Negative mg/dL    Ketones, Urine Negative NEG mg/dL     Bilirubin, Urine Negative NEG      Blood, Urine MODERATE (A) NEG      Urobilinogen, Urine 0.2 0.2 - 1.0 EU/dL    Nitrite, Urine Negative NEG      Leukocyte Esterase, Urine MODERATE (A) NEG     Urinalysis, Micro    Collection Time: 08/22/24  7:42 PM   Result Value Ref Range    WBC, UA 20-50 0 /hpf    RBC, UA 0-3 0 /hpf    Epithelial Cells, UA 0-3 0 /hpf    BACTERIA, URINE TRACE 0 /hpf    Mucus, UA 0 0 /lpf    Other observations RESULTS VERIFIED MANUALLY     Culture, Urine    Collection Time: 08/22/24  7:42 PM    Specimen: Urine, clean catch   Result Value Ref Range    Special Requests NO SPECIAL REQUESTS      Culture (A)       >100,000 COLONIES/mL Gram negative rods IDENTIFICATION AND SUSCEPTIBILITY TO FOLLOW    Culture <10,000 COLONIES/mL MIXED SKIN HARI ISOLATED     Culture, Blood 1    Collection Time: 08/22/24  9:53 PM    Specimen: Blood   Result Value Ref Range    Special Requests NO SPECIAL REQUESTS  LEFT  HAND        Culture NO GROWTH AFTER 22 HOURS     Culture, Blood 1    Collection Time: 08/22/24  9:53 PM    Specimen: Blood   Result Value Ref Range    Special Requests NO SPECIAL REQUESTS  RIGHT  Antecubital        Culture NO GROWTH AFTER 22 HOURS     CBC with Auto Differential    Collection Time: 08/24/24  6:46 AM   Result Value Ref Range    WBC 12.3 (H) 4.3 - 11.1 K/uL    RBC 3.44 (L) 4.05 - 5.2 M/uL    Hemoglobin 9.1 (L) 11.7 - 15.4 g/dL    Hematocrit 30.4 (L) 35.8 - 46.3 %    MCV 88.4 82 - 102 FL    MCH 26.5 26.1 - 32.9 PG    MCHC 29.9 (L) 31.4 - 35.0 g/dL    RDW 16.8 (H) 11.9 - 14.6 %    Platelets 282 150 - 450 K/uL    MPV 10.1 9.4 - 12.3 FL    nRBC 0.00 0.0 - 0.2 K/uL    Differential Type AUTOMATED      Neutrophils % 73 43 - 78 %    Lymphocytes % 19 13 - 44 %    Monocytes % 8 4.0 - 12.0 %    Eosinophils % 0 (L) 0.5 - 7.8 %    Basophils % 0 0.0 - 2.0 %    Immature Granulocytes % 0 0.0 - 5.0 %    Neutrophils Absolute 8.9 (H) 1.7 - 8.2 K/UL    Lymphocytes Absolute 2.3 0.5 - 4.6 K/UL    Monocytes Absolute

## 2024-08-24 NOTE — PROGRESS NOTES
US Guided PIV access-    Ultrasound was used to find the vein which was compressible and without any ultrasound features of an artery or nerve bundle. Skin was cleaned and disinfected prior to IV puncture.  Under real-time ultrasound guidance peripheral access was obtained in the left AC using 22 G 1.75\" Peripheral IV catheter after 1 attempt(s). Blood return was present and IV flushed without difficulty with no clinical signs of infiltration. IV dressing applied and no immediate complications noted. Patient tolerated the procedure well.

## 2024-08-24 NOTE — PLAN OF CARE
Problem: Discharge Planning  Goal: Discharge to home or other facility with appropriate resources  8/23/2024 2055 by Gely White RN  Outcome: Not Progressing  8/23/2024 1814 by Catracho Faria RN  Outcome: Progressing     Problem: Safety - Adult  Goal: Free from fall injury  8/23/2024 2055 by Gely White RN  Outcome: Not Progressing  Flowsheets (Taken 8/23/2024 2047)  Free From Fall Injury: Instruct family/caregiver on patient safety  8/23/2024 1814 by Catracho Faria RN  Outcome: Progressing     Problem: Skin/Tissue Integrity  Goal: Absence of new skin breakdown  Description: 1.  Monitor for areas of redness and/or skin breakdown  2.  Assess vascular access sites hourly  3.  Every 4-6 hours minimum:  Change oxygen saturation probe site  4.  Every 4-6 hours:  If on nasal continuous positive airway pressure, respiratory therapy assess nares and determine need for appliance change or resting period.  Outcome: Not Progressing     Problem: Discharge Planning  Goal: Discharge to home or other facility with appropriate resources  8/23/2024 2055 by Gely White RN  Outcome: Not Progressing  8/23/2024 1814 by Catracho Faria RN  Outcome: Progressing     Problem: Safety - Adult  Goal: Free from fall injury  8/23/2024 2055 by Gely White RN  Outcome: Not Progressing  Flowsheets (Taken 8/23/2024 2047)  Free From Fall Injury: Instruct family/caregiver on patient safety  8/23/2024 1814 by Catracho Faria RN  Outcome: Progressing     Problem: Skin/Tissue Integrity  Goal: Absence of new skin breakdown  Description: 1.  Monitor for areas of redness and/or skin breakdown  2.  Assess vascular access sites hourly  3.  Every 4-6 hours minimum:  Change oxygen saturation probe site  4.  Every 4-6 hours:  If on nasal continuous positive airway pressure, respiratory therapy assess nares and determine need for appliance change or resting period.  Outcome: Not Progressing

## 2024-08-24 NOTE — PROGRESS NOTES
TRANSFER - IN REPORT:    Verbal report received from Jordan Navarro  being received from ED for routine progression of patient care.      Report consisted of patient's Situation, Background, Assessment and   Recommendations(SBAR).     Information from the following report(s) Nurse Handoff Report was reviewed with the receiving nurse.    Opportunity for questions and clarification was provided.      Assessment will be completed upon patient's arrival to unit and care assumed.

## 2024-08-24 NOTE — PROGRESS NOTES
Pt arrived on unit around 1600 and did not complain of pain. All known needs met at this time. Bed is currently low, call light was left in reach, and pt was encouraged to ask for assistance. Pt was left resting in bed with no complaints.

## 2024-08-25 ENCOUNTER — APPOINTMENT (OUTPATIENT)
Dept: ULTRASOUND IMAGING | Age: 34
DRG: 720 | End: 2024-08-25
Attending: FAMILY MEDICINE
Payer: MEDICAID

## 2024-08-25 LAB
ALBUMIN SERPL-MCNC: 2.3 G/DL (ref 3.5–5)
ALBUMIN/GLOB SERPL: 0.5 (ref 1–1.9)
ALP SERPL-CCNC: 44 U/L (ref 35–104)
ALT SERPL-CCNC: 7 U/L (ref 12–65)
ANION GAP SERPL CALC-SCNC: 9 MMOL/L (ref 9–18)
AST SERPL-CCNC: 12 U/L (ref 15–37)
BACTERIA SPEC CULT: ABNORMAL
BACTERIA SPEC CULT: ABNORMAL
BACTERIA SPEC CULT: NORMAL
BACTERIA SPEC CULT: NORMAL
BASOPHILS # BLD: 0 K/UL (ref 0–0.2)
BASOPHILS NFR BLD: 0 % (ref 0–2)
BILIRUB SERPL-MCNC: 0.8 MG/DL (ref 0–1.2)
BUN SERPL-MCNC: 9 MG/DL (ref 6–23)
CALCIUM SERPL-MCNC: 8 MG/DL (ref 8.8–10.2)
CHLORIDE SERPL-SCNC: 103 MMOL/L (ref 98–107)
CO2 SERPL-SCNC: 26 MMOL/L (ref 20–28)
CREAT SERPL-MCNC: 0.87 MG/DL (ref 0.6–1.1)
DIFFERENTIAL METHOD BLD: ABNORMAL
EOSINOPHIL # BLD: 0.1 K/UL (ref 0–0.8)
EOSINOPHIL NFR BLD: 1 % (ref 0.5–7.8)
ERYTHROCYTE [DISTWIDTH] IN BLOOD BY AUTOMATED COUNT: 16.7 % (ref 11.9–14.6)
GLOBULIN SER CALC-MCNC: 4.2 G/DL (ref 2.3–3.5)
GLUCOSE SERPL-MCNC: 97 MG/DL (ref 70–99)
HCT VFR BLD AUTO: 28.3 % (ref 35.8–46.3)
HGB BLD-MCNC: 8.4 G/DL (ref 11.7–15.4)
IMM GRANULOCYTES # BLD AUTO: 0 K/UL (ref 0–0.5)
IMM GRANULOCYTES NFR BLD AUTO: 0 % (ref 0–5)
LACTATE SERPL-SCNC: 1.1 MMOL/L (ref 0.5–2)
LYMPHOCYTES # BLD: 2.5 K/UL (ref 0.5–4.6)
LYMPHOCYTES NFR BLD: 23 % (ref 13–44)
MCH RBC QN AUTO: 26.4 PG (ref 26.1–32.9)
MCHC RBC AUTO-ENTMCNC: 29.7 G/DL (ref 31.4–35)
MCV RBC AUTO: 89 FL (ref 82–102)
MONOCYTES # BLD: 0.8 K/UL (ref 0.1–1.3)
MONOCYTES NFR BLD: 7 % (ref 4–12)
NEUTS SEG # BLD: 7.7 K/UL (ref 1.7–8.2)
NEUTS SEG NFR BLD: 69 % (ref 43–78)
NRBC # BLD: 0 K/UL (ref 0–0.2)
PLATELET # BLD AUTO: 277 K/UL (ref 150–450)
PMV BLD AUTO: 10.3 FL (ref 9.4–12.3)
POTASSIUM SERPL-SCNC: 3.6 MMOL/L (ref 3.5–5.1)
PROT SERPL-MCNC: 6.5 G/DL (ref 6.3–8.2)
RBC # BLD AUTO: 3.18 M/UL (ref 4.05–5.2)
SERVICE CMNT-IMP: ABNORMAL
SERVICE CMNT-IMP: NORMAL
SERVICE CMNT-IMP: NORMAL
SODIUM SERPL-SCNC: 138 MMOL/L (ref 136–145)
WBC # BLD AUTO: 11.1 K/UL (ref 4.3–11.1)

## 2024-08-25 PROCEDURE — 83605 ASSAY OF LACTIC ACID: CPT

## 2024-08-25 PROCEDURE — 2580000003 HC RX 258: Performed by: PHYSICIAN ASSISTANT

## 2024-08-25 PROCEDURE — 6370000000 HC RX 637 (ALT 250 FOR IP): Performed by: INTERNAL MEDICINE

## 2024-08-25 PROCEDURE — 2580000003 HC RX 258

## 2024-08-25 PROCEDURE — 2580000003 HC RX 258: Performed by: HOSPITALIST

## 2024-08-25 PROCEDURE — 6360000002 HC RX W HCPCS: Performed by: PHYSICIAN ASSISTANT

## 2024-08-25 PROCEDURE — 85025 COMPLETE CBC W/AUTO DIFF WBC: CPT

## 2024-08-25 PROCEDURE — 6370000000 HC RX 637 (ALT 250 FOR IP): Performed by: HOSPITALIST

## 2024-08-25 PROCEDURE — 76770 US EXAM ABDO BACK WALL COMP: CPT

## 2024-08-25 PROCEDURE — 80053 COMPREHEN METABOLIC PANEL: CPT

## 2024-08-25 PROCEDURE — 6360000002 HC RX W HCPCS

## 2024-08-25 PROCEDURE — 1100000000 HC RM PRIVATE

## 2024-08-25 PROCEDURE — 6360000002 HC RX W HCPCS: Performed by: HOSPITALIST

## 2024-08-25 PROCEDURE — 36415 COLL VENOUS BLD VENIPUNCTURE: CPT

## 2024-08-25 RX ORDER — DICYCLOMINE HCL 20 MG
20 TABLET ORAL
Status: DISCONTINUED | OUTPATIENT
Start: 2024-08-25 | End: 2024-08-27 | Stop reason: HOSPADM

## 2024-08-25 RX ADMIN — DICYCLOMINE HYDROCHLORIDE 20 MG: 20 TABLET ORAL at 21:25

## 2024-08-25 RX ADMIN — DICYCLOMINE HYDROCHLORIDE 20 MG: 20 TABLET ORAL at 18:34

## 2024-08-25 RX ADMIN — OXYCODONE HYDROCHLORIDE 5 MG: 5 TABLET ORAL at 18:38

## 2024-08-25 RX ADMIN — ONDANSETRON 4 MG: 4 TABLET, ORALLY DISINTEGRATING ORAL at 01:09

## 2024-08-25 RX ADMIN — SODIUM CHLORIDE: 9 INJECTION, SOLUTION INTRAVENOUS at 07:58

## 2024-08-25 RX ADMIN — SODIUM CHLORIDE: 9 INJECTION, SOLUTION INTRAVENOUS at 18:36

## 2024-08-25 RX ADMIN — CEFEPIME 2000 MG: 2 INJECTION, POWDER, FOR SOLUTION INTRAVENOUS at 07:59

## 2024-08-25 RX ADMIN — PIPERACILLIN AND TAZOBACTAM 4500 MG: 4; .5 INJECTION, POWDER, FOR SOLUTION INTRAVENOUS at 12:13

## 2024-08-25 RX ADMIN — ENOXAPARIN SODIUM 40 MG: 100 INJECTION SUBCUTANEOUS at 21:25

## 2024-08-25 RX ADMIN — DICYCLOMINE HYDROCHLORIDE 20 MG: 20 TABLET ORAL at 12:13

## 2024-08-25 RX ADMIN — PIPERACILLIN AND TAZOBACTAM 3375 MG: 3; .375 INJECTION, POWDER, LYOPHILIZED, FOR SOLUTION INTRAVENOUS at 18:37

## 2024-08-25 RX ADMIN — SODIUM CHLORIDE, PRESERVATIVE FREE 10 ML: 5 INJECTION INTRAVENOUS at 21:26

## 2024-08-25 ASSESSMENT — PAIN SCALES - GENERAL: PAINLEVEL_OUTOF10: 5

## 2024-08-25 NOTE — PROGRESS NOTES
Hospitalist Progress Note   Admit Date:  2024 12:52 AM   Name:  Tamar Navarro   Age:  34 y.o.  Sex:  female  :  1990   MRN:  [id number]   Room:  60/    Presenting/Chief Complaint: No chief complaint on file.     Reason(s) for Admission: Acute pyelonephritis [N10]     Hospital Course:   Tamar Nvaarro is a 34 y.o. female with medical history of morbid obesity, recurrent UTIs and pyelonephritis who presented with complaints of bilateral flank pain in the setting of UTI and was found to have failed outpatient treatment and was admitted for pyelonephritis. Her urine cultures have so far been positive for GNR and are pending speciation.    Subjective & 24hr Events:   Seen at bedside. She was febrile again last night with Tmax 100.9 orally. Feels a bit better this AM and complains of fatigue.    Assessment & Plan:     Principal Problem:    Acute pyelonephritis  - renal US pending today  - will place outpatient urology referral given her recurrent UTIs  - will broaden to zosyn given she continues to be febrile despite multiple dose of cefepime and a dose of CTX    Active Problems:    Morbid obesity (HCC)  - complicates care and increases all cause mortality      EMILIE (obstructive sleep apnea)  - defer to outpatient providers, specifically PCP    Anticipated Discharge Arrangements:   Home    ADDENDUM: Asked by patient to come to bedside to discuss her US results. She is concerned that her kidney's themselves are failing and that is causing her UTIs. She admits she often holds her urine because she is busy or does not want to get out of the bed. She also admits to drinking a considerable amount of liquor on the weekends and is worried this is causing her symptoms. Also tends to get UTIs when she has BV and has been using boric acid for treatment of her BV    Encouraged urination after intercourse. Not holding her urine. Probiotics. No use of scented soaps/douching.  Recommend avoiding boric

## 2024-08-25 NOTE — FLOWSHEET NOTE
08/25/24 0000 08/25/24 0305   Vital Signs   Temp 100 °F (37.8 °C) 98.6 °F (37 °C)   Temp Source Oral Oral     Fever resolved after ice packs and cold cloths applied to pt.  Medicated for nausea x1 this shift with good results.  Hourly rounds performed this shift.  VS stable.  Pt medicated per MAR.  All known needs met at this time.  Bed low and locked, call light in reach.  Bedside shift report given to oncoming nurse.

## 2024-08-25 NOTE — PROGRESS NOTES
Hospitalist Progress Note   Admit Date:  2024 12:52 AM   Name:  Tamar Navarro   Age:  34 y.o.  Sex:  female  :  1990   MRN:  [id number]   Room:  601/    Presenting/Chief Complaint: No chief complaint on file.     Reason(s) for Admission: Acute pyelonephritis [N10]     Hospital Course:   Tamar Navarro is a 34 y.o. female with medical history of recurrent UTIs and pyelonephritis, morbid obesity who presented withbilateral flank pain, lower abdominal pain started yesterday which continued to get worse. Patient reports she was recently diagnosed with UTI and was treated completed antibiotics started feeling well since discharge but started experiencing pain so presented to ER for further evaluation.  Previous UTIs positive for pansensitive E. coli.  CT abdomen pelvis ED indicated recurrent acute pyelonephritis.  Patient ship admitted for further management.    Febrile again overnight between  and .  Cefepime reinitiated.  Urine cultures positive for GNR's      Subjective & 24hr Events:   Patient seen and evaluated  New patient for me today  Afebrile overnight  Complains of abdominal pain  Denies chest pain nausea vomiting fevers or chills      Assessment & Plan:     Acute pyelonephritis  Positive urinalysis with symptoms and CT scan findings.  Current urine culture growing GNR's.  Microbial history indicates pansensitive E. coli.    Continue cefepime  Urine cultures positive for E. Coli  Check renal ultrasound for radiolucent stone    Abdominal pain  Continue as needed pain meds  Start Bentyl  Check lactic acid      Obesity  -Advocate for weight loss    EMILIE  -Reports diagnosed history but has no CPAP machine at home.  Encourage patient to reestablish care with sleep medicine    Anticipated Discharge Arrangements:   Home    PT/OT evals ordered?  Not ordered; patient not expected to need rehab  Diet:  ADULT DIET; Regular  VTE prophylaxis: Lovenox  Code status: Full  (H) 4.3 - 11.1 K/uL    RBC 3.44 (L) 4.05 - 5.2 M/uL    Hemoglobin 9.1 (L) 11.7 - 15.4 g/dL    Hematocrit 30.4 (L) 35.8 - 46.3 %    MCV 88.4 82 - 102 FL    MCH 26.5 26.1 - 32.9 PG    MCHC 29.9 (L) 31.4 - 35.0 g/dL    RDW 16.8 (H) 11.9 - 14.6 %    Platelets 282 150 - 450 K/uL    MPV 10.1 9.4 - 12.3 FL    nRBC 0.00 0.0 - 0.2 K/uL    Differential Type AUTOMATED      Neutrophils % 73 43 - 78 %    Lymphocytes % 19 13 - 44 %    Monocytes % 8 4.0 - 12.0 %    Eosinophils % 0 (L) 0.5 - 7.8 %    Basophils % 0 0.0 - 2.0 %    Immature Granulocytes % 0 0.0 - 5.0 %    Neutrophils Absolute 8.9 (H) 1.7 - 8.2 K/UL    Lymphocytes Absolute 2.3 0.5 - 4.6 K/UL    Monocytes Absolute 1.0 0.1 - 1.3 K/UL    Eosinophils Absolute 0.0 0.0 - 0.8 K/UL    Basophils Absolute 0.0 0.0 - 0.2 K/UL    Immature Granulocytes Absolute 0.0 0.0 - 0.5 K/UL   Comprehensive Metabolic Panel w/ Reflex to MG    Collection Time: 08/24/24  6:46 AM   Result Value Ref Range    Sodium 135 (L) 136 - 145 mmol/L    Potassium 3.5 3.5 - 5.1 mmol/L    Chloride 102 98 - 107 mmol/L    CO2 24 20 - 28 mmol/L    Anion Gap 9 9 - 18 mmol/L    Glucose 103 (H) 70 - 99 mg/dL    BUN 10 6 - 23 MG/DL    Creatinine 0.93 0.60 - 1.10 MG/DL    Est, Glom Filt Rate 83 >60 ml/min/1.73m2    Calcium 8.0 (L) 8.8 - 10.2 MG/DL    Total Bilirubin 0.6 0.0 - 1.2 MG/DL    ALT <5 (L) 12 - 65 U/L    AST 13 (L) 15 - 37 U/L    Alk Phosphatase 39 35 - 104 U/L    Total Protein 7.1 6.3 - 8.2 g/dL    Albumin 2.4 (L) 3.5 - 5.0 g/dL    Globulin 4.6 (H) 2.3 - 3.5 g/dL    Albumin/Globulin Ratio 0.5 (L) 1.0 - 1.9     Magnesium    Collection Time: 08/24/24  6:46 AM   Result Value Ref Range    Magnesium 1.8 1.8 - 2.4 mg/dL   CBC with Auto Differential    Collection Time: 08/25/24  4:46 AM   Result Value Ref Range    WBC 11.1 4.3 - 11.1 K/uL    RBC 3.18 (L) 4.05 - 5.2 M/uL    Hemoglobin 8.4 (L) 11.7 - 15.4 g/dL    Hematocrit 28.3 (L) 35.8 - 46.3 %    MCV 89.0 82 - 102 FL    MCH 26.4 26.1 - 32.9 PG    MCHC 29.7 (L) 31.4

## 2024-08-26 PROBLEM — D64.9 NORMOCYTIC ANEMIA: Status: ACTIVE | Noted: 2024-08-26

## 2024-08-26 LAB
ALBUMIN SERPL-MCNC: 2.3 G/DL (ref 3.5–5)
ALBUMIN/GLOB SERPL: 0.5 (ref 1–1.9)
ALP SERPL-CCNC: 72 U/L (ref 35–104)
ALT SERPL-CCNC: <5 U/L (ref 12–65)
ANION GAP SERPL CALC-SCNC: 8 MMOL/L (ref 9–18)
AST SERPL-CCNC: 13 U/L (ref 15–37)
BASOPHILS # BLD: 0 K/UL (ref 0–0.2)
BASOPHILS NFR BLD: 0 % (ref 0–2)
BILIRUB SERPL-MCNC: 0.5 MG/DL (ref 0–1.2)
BUN SERPL-MCNC: 8 MG/DL (ref 6–23)
CALCIUM SERPL-MCNC: 8.6 MG/DL (ref 8.8–10.2)
CHLORIDE SERPL-SCNC: 104 MMOL/L (ref 98–107)
CO2 SERPL-SCNC: 27 MMOL/L (ref 20–28)
CREAT SERPL-MCNC: 0.88 MG/DL (ref 0.6–1.1)
DIFFERENTIAL METHOD BLD: ABNORMAL
EOSINOPHIL # BLD: 0.2 K/UL (ref 0–0.8)
EOSINOPHIL NFR BLD: 2 % (ref 0.5–7.8)
ERYTHROCYTE [DISTWIDTH] IN BLOOD BY AUTOMATED COUNT: 16.8 % (ref 11.9–14.6)
EST. AVERAGE GLUCOSE BLD GHB EST-MCNC: 110 MG/DL
GLOBULIN SER CALC-MCNC: 4.7 G/DL (ref 2.3–3.5)
GLUCOSE SERPL-MCNC: 101 MG/DL (ref 70–99)
HBA1C MFR BLD: 5.5 % (ref 0–5.6)
HCT VFR BLD AUTO: 28.3 % (ref 35.8–46.3)
HGB BLD-MCNC: 8.7 G/DL (ref 11.7–15.4)
IMM GRANULOCYTES # BLD AUTO: 0 K/UL (ref 0–0.5)
IMM GRANULOCYTES NFR BLD AUTO: 0 % (ref 0–5)
LYMPHOCYTES # BLD: 3 K/UL (ref 0.5–4.6)
LYMPHOCYTES NFR BLD: 30 % (ref 13–44)
MCH RBC QN AUTO: 26.9 PG (ref 26.1–32.9)
MCHC RBC AUTO-ENTMCNC: 30.7 G/DL (ref 31.4–35)
MCV RBC AUTO: 87.6 FL (ref 82–102)
MONOCYTES # BLD: 0.7 K/UL (ref 0.1–1.3)
MONOCYTES NFR BLD: 7 % (ref 4–12)
NEUTS SEG # BLD: 6 K/UL (ref 1.7–8.2)
NEUTS SEG NFR BLD: 61 % (ref 43–78)
NRBC # BLD: 0 K/UL (ref 0–0.2)
PLATELET # BLD AUTO: 279 K/UL (ref 150–450)
PMV BLD AUTO: 10.4 FL (ref 9.4–12.3)
POTASSIUM SERPL-SCNC: 3.7 MMOL/L (ref 3.5–5.1)
PROT SERPL-MCNC: 7 G/DL (ref 6.3–8.2)
RBC # BLD AUTO: 3.23 M/UL (ref 4.05–5.2)
SERVICE CMNT-IMP: NORMAL
SODIUM SERPL-SCNC: 138 MMOL/L (ref 136–145)
WBC # BLD AUTO: 9.9 K/UL (ref 4.3–11.1)
WET PREP GENITAL: NORMAL
WET PREP GENITAL: NORMAL

## 2024-08-26 PROCEDURE — 1100000000 HC RM PRIVATE

## 2024-08-26 PROCEDURE — 83036 HEMOGLOBIN GLYCOSYLATED A1C: CPT

## 2024-08-26 PROCEDURE — 2580000003 HC RX 258: Performed by: PHYSICIAN ASSISTANT

## 2024-08-26 PROCEDURE — 80053 COMPREHEN METABOLIC PANEL: CPT

## 2024-08-26 PROCEDURE — 6370000000 HC RX 637 (ALT 250 FOR IP): Performed by: HOSPITALIST

## 2024-08-26 PROCEDURE — 87491 CHLMYD TRACH DNA AMP PROBE: CPT

## 2024-08-26 PROCEDURE — 85025 COMPLETE CBC W/AUTO DIFF WBC: CPT

## 2024-08-26 PROCEDURE — 87591 N.GONORRHOEAE DNA AMP PROB: CPT

## 2024-08-26 PROCEDURE — 36415 COLL VENOUS BLD VENIPUNCTURE: CPT

## 2024-08-26 PROCEDURE — 2580000003 HC RX 258: Performed by: HOSPITALIST

## 2024-08-26 PROCEDURE — 6360000002 HC RX W HCPCS: Performed by: PHYSICIAN ASSISTANT

## 2024-08-26 PROCEDURE — 6360000002 HC RX W HCPCS: Performed by: HOSPITALIST

## 2024-08-26 PROCEDURE — 87210 SMEAR WET MOUNT SALINE/INK: CPT

## 2024-08-26 PROCEDURE — 6370000000 HC RX 637 (ALT 250 FOR IP): Performed by: INTERNAL MEDICINE

## 2024-08-26 RX ADMIN — ACETAMINOPHEN 650 MG: 325 TABLET ORAL at 05:31

## 2024-08-26 RX ADMIN — PIPERACILLIN AND TAZOBACTAM 3375 MG: 3; .375 INJECTION, POWDER, LYOPHILIZED, FOR SOLUTION INTRAVENOUS at 11:08

## 2024-08-26 RX ADMIN — DICYCLOMINE HYDROCHLORIDE 20 MG: 20 TABLET ORAL at 21:22

## 2024-08-26 RX ADMIN — ENOXAPARIN SODIUM 40 MG: 100 INJECTION SUBCUTANEOUS at 21:22

## 2024-08-26 RX ADMIN — DICYCLOMINE HYDROCHLORIDE 20 MG: 20 TABLET ORAL at 05:30

## 2024-08-26 RX ADMIN — DICYCLOMINE HYDROCHLORIDE 20 MG: 20 TABLET ORAL at 16:45

## 2024-08-26 RX ADMIN — SODIUM CHLORIDE, PRESERVATIVE FREE 10 ML: 5 INJECTION INTRAVENOUS at 21:22

## 2024-08-26 RX ADMIN — PIPERACILLIN AND TAZOBACTAM 3375 MG: 3; .375 INJECTION, POWDER, LYOPHILIZED, FOR SOLUTION INTRAVENOUS at 02:48

## 2024-08-26 RX ADMIN — DICYCLOMINE HYDROCHLORIDE 20 MG: 20 TABLET ORAL at 11:06

## 2024-08-26 RX ADMIN — PIPERACILLIN AND TAZOBACTAM 3375 MG: 3; .375 INJECTION, POWDER, LYOPHILIZED, FOR SOLUTION INTRAVENOUS at 19:12

## 2024-08-26 RX ADMIN — SODIUM CHLORIDE: 9 INJECTION, SOLUTION INTRAVENOUS at 11:08

## 2024-08-26 ASSESSMENT — PAIN SCALES - GENERAL: PAINLEVEL_OUTOF10: 0

## 2024-08-26 NOTE — PROGRESS NOTES
Pt slept well overnight.  Woke up this am and reports feeling \"off\" and having headache.  Tylenol given for headache.  Temp at 0300 VS was low grade fever.  Abx administered per MAR.  Hourly rounds performed this shift.  VS stable.  All known needs met at this time.  Bed low and locked, call light in reach.

## 2024-08-26 NOTE — CARE COORDINATION
08/26/24 1151   Service Assessment   Patient Orientation Alert and Oriented   Cognition Alert   History Provided By Patient   Primary Caregiver Self   Support Systems Family Members   Patient's Healthcare Decision Maker is: Legal Next of Kin   PCP Verified by CM Yes   Last Visit to PCP Within last year   Prior Functional Level Independent in ADLs/IADLs   Current Functional Level Independent in ADLs/IADLs   Can patient return to prior living arrangement Yes   Ability to make needs known: Good   Family able to assist with home care needs: Yes   Would you like for me to discuss the discharge plan with any other family members/significant others, and if so, who? No   Financial Resources None   Community Resources None   Condition of Participation: Discharge Planning   The Plan for Transition of Care is related to the following treatment goals: return home with family   The Patient and/or Patient Representative was provided with a Choice of Provider? Patient   The Patient and/Or Patient Representative agree with the Discharge Plan? Yes   Freedom of Choice list was provided with basic dialogue that supports the patient's individualized plan of care/goals, treatment preferences, and shares the quality data associated with the providers?  Yes     Patient admitted for acute pyelonephritis. Initial assessment completed with patient at bedside. Patient lives with family. She is IND at baseline. No DME in the home. Demographics and PCP confirmed. Patient goes to [facility] for primary care. Patient is listed as self pay but reports having [place] Medicaid. Discharge plan is to return home with family support. No anticipated discharge needs.    Vashti MATHIAS, ACM  [place]

## 2024-08-26 NOTE — PROGRESS NOTES
Hospitalist Progress Note   Admit Date:  2024 12:52 AM   Name:  Tamar Navarro   Age:  34 y.o.  Sex:  female  :  1990   MRN:  [id number]   Room:  [facility]/    Presenting/Chief Complaint: No chief complaint on file.     Reason(s) for Admission: Acute pyelonephritis [N10]     Hospital Course:   Patient is a 35 y/o female with medical history of morbid obesity, EMILIE, recurrent UTI, chlamydia, bacterial vaginosis, normocytic anemia, recent admission - for E. Coli bacteremia and sepsis secondary to pyelonephritis who presented to ED with cc bilateral flank pain, nausea, vomiting, myalgias, dysuria, pelvic pain. She completed course of Ciprofloxacin as prescribed for recent infection (total 7 day course). Patient was tachycardic and with low grade fever to 100.5 on arrival. Labs notable for WBC 12.3 LA 1.1. Urinalysis infectious. CT imaging revealed persistent pyelonephritis, similar to prior, no hydronephrosis or obstruction. Patient met sepsis criteria with fever, tachycardia, leukocytosis; not severe, no evidence of organ dysfunction. She was initiated on Ceftriaxone IV with blood cultures obtained. She was admitted for further management.    Leukocytosis resolved, but she remained febrile so antibiotics were adjusted to Cefepime and then Zosyn. Urine culture finalized with pan-sensitive E. Coli. Blood cultures remain negative to date. WBC remains normal. Fever curve is improving. She is voiding well, and renal function remains normal. Renal ultrasound obtained and remains negative for any stones.     Subjective & 24hr Events:   Patient alert, oriented, conversational, prone in bed. Reports feeling okay, mildly improved. Pain doing a bit better. Would like to maintain inpatient stay one more day. Plan for discharge tomorrow on oral antibiotics.    Given history and urinary infection recurrence, will check G/C and send wet prep as well for evaluation of yeast, trichomoniasis, or

## 2024-08-27 VITALS
OXYGEN SATURATION: 96 % | SYSTOLIC BLOOD PRESSURE: 129 MMHG | WEIGHT: 293 LBS | RESPIRATION RATE: 17 BRPM | HEART RATE: 75 BPM | TEMPERATURE: 97.9 F | DIASTOLIC BLOOD PRESSURE: 77 MMHG | HEIGHT: 63 IN | BODY MASS INDEX: 51.91 KG/M2

## 2024-08-27 PROBLEM — N10 ACUTE PYELONEPHRITIS: Status: RESOLVED | Noted: 2024-08-23 | Resolved: 2024-08-27

## 2024-08-27 PROBLEM — A41.9 SEPSIS (HCC): Status: RESOLVED | Noted: 2024-08-04 | Resolved: 2024-08-27

## 2024-08-27 PROBLEM — E61.1 IRON DEFICIENCY: Status: ACTIVE | Noted: 2024-08-27

## 2024-08-27 LAB
FERRITIN SERPL-MCNC: 299 NG/ML (ref 8–388)
IRON SATN MFR SERPL: 18 % (ref 20–50)
IRON SERPL-MCNC: 34 UG/DL (ref 35–100)
TIBC SERPL-MCNC: 187 UG/DL (ref 240–450)
UIBC SERPL-MCNC: 153 UG/DL (ref 112–347)

## 2024-08-27 PROCEDURE — 6360000002 HC RX W HCPCS: Performed by: PHYSICIAN ASSISTANT

## 2024-08-27 PROCEDURE — 36415 COLL VENOUS BLD VENIPUNCTURE: CPT

## 2024-08-27 PROCEDURE — 83540 ASSAY OF IRON: CPT

## 2024-08-27 PROCEDURE — 6370000000 HC RX 637 (ALT 250 FOR IP): Performed by: INTERNAL MEDICINE

## 2024-08-27 PROCEDURE — 82728 ASSAY OF FERRITIN: CPT

## 2024-08-27 PROCEDURE — 2580000003 HC RX 258: Performed by: PHYSICIAN ASSISTANT

## 2024-08-27 PROCEDURE — 6370000000 HC RX 637 (ALT 250 FOR IP): Performed by: HOSPITALIST

## 2024-08-27 PROCEDURE — 83550 IRON BINDING TEST: CPT

## 2024-08-27 RX ORDER — DICYCLOMINE HCL 20 MG
20 TABLET ORAL
Qty: 28 TABLET | Refills: 0 | Status: SHIPPED | OUTPATIENT
Start: 2024-08-27 | End: 2024-09-03

## 2024-08-27 RX ORDER — FLUCONAZOLE 150 MG/1
150 TABLET ORAL ONCE
Qty: 1 TABLET | Refills: 0 | Status: SHIPPED | OUTPATIENT
Start: 2024-08-27 | End: 2024-08-27

## 2024-08-27 RX ORDER — CEFADROXIL 1000 MG/1
1 TABLET ORAL 2 TIMES DAILY
Qty: 14 TABLET | Refills: 0 | Status: SHIPPED | OUTPATIENT
Start: 2024-08-27 | End: 2024-09-03

## 2024-08-27 RX ADMIN — OXYCODONE HYDROCHLORIDE 5 MG: 5 TABLET ORAL at 02:32

## 2024-08-27 RX ADMIN — PIPERACILLIN AND TAZOBACTAM 3375 MG: 3; .375 INJECTION, POWDER, LYOPHILIZED, FOR SOLUTION INTRAVENOUS at 02:17

## 2024-08-27 RX ADMIN — DICYCLOMINE HYDROCHLORIDE 20 MG: 20 TABLET ORAL at 05:26

## 2024-08-27 ASSESSMENT — PAIN SCALES - GENERAL
PAINLEVEL_OUTOF10: 0
PAINLEVEL_OUTOF10: 8

## 2024-08-27 ASSESSMENT — PAIN DESCRIPTION - ORIENTATION: ORIENTATION: RIGHT

## 2024-08-27 ASSESSMENT — PAIN DESCRIPTION - DESCRIPTORS: DESCRIPTORS: ACHING;DISCOMFORT

## 2024-08-27 ASSESSMENT — PAIN - FUNCTIONAL ASSESSMENT: PAIN_FUNCTIONAL_ASSESSMENT: ACTIVITIES ARE NOT PREVENTED

## 2024-08-27 ASSESSMENT — PAIN DESCRIPTION - LOCATION: LOCATION: FLANK

## 2024-08-27 NOTE — PROGRESS NOTES
Hourly rounds performed this shift. Patient resting with no complaints at this time. Bed locked in lowest position, call light, and all personal belongings in pt reach. VS stable, IV patent. Pt received prn pain medication this shift. Pt ambulated to bathroom with no problems during this shift. will give report to oncoming nurse.

## 2024-08-27 NOTE — PROGRESS NOTES
Discharge instructions given. Education provided. Medication changes and follow-up appointments discussed with patient. Prescriptions provided. AVS reviewed. PIV removed with catheter intact, no signs or symptoms of infection, no redness, no warmth and no edema. All questions answered and patient/family have verbally voiced understanding.

## 2024-08-27 NOTE — PLAN OF CARE
Problem: Discharge Planning  Goal: Discharge to home or other facility with appropriate resources  8/26/2024 2311 by Louisa Reyez RN  Outcome: Progressing  8/26/2024 1019 by Saba Levin, RN  Outcome: Progressing  Flowsheets (Taken 8/26/2024 0758)  Discharge to home or other facility with appropriate resources:   Identify barriers to discharge with patient and caregiver   Arrange for needed discharge resources and transportation as appropriate   Identify discharge learning needs (meds, wound care, etc)   Refer to discharge planning if patient needs post-hospital services based on physician order or complex needs related to functional status, cognitive ability or social support system     Problem: Safety - Adult  Goal: Free from fall injury  8/26/2024 2311 by Louisa Reyez RN  Outcome: Progressing  8/26/2024 1019 by Saba Levin RN  Outcome: Progressing     Problem: Skin/Tissue Integrity  Goal: Absence of new skin breakdown  Description: 1.  Monitor for areas of redness and/or skin breakdown  2.  Assess vascular access sites hourly  3.  Every 4-6 hours minimum:  Change oxygen saturation probe site  4.  Every 4-6 hours:  If on nasal continuous positive airway pressure, respiratory therapy assess nares and determine need for appliance change or resting period.  8/26/2024 2311 by Louisa Reyez RN  Outcome: Progressing  8/26/2024 1019 by Saba Levin, RN  Outcome: Progressing     Problem: Pain  Goal: Verbalizes/displays adequate comfort level or baseline comfort level  8/26/2024 2311 by Louisa Reyez RN  Outcome: Progressing  8/26/2024 1019 by Saba Levin, RN  Outcome: Progressing

## 2024-08-27 NOTE — DISCHARGE INSTRUCTIONS
DISCHARGE SUMMARY from Nurse    PATIENT INSTRUCTIONS:    After general anesthesia or intravenous sedation, for 24 hours or while taking prescription Narcotics:  Limit your activities  Do not drive and operate hazardous machinery  Do not make important personal or business decisions  Do  not drink alcoholic beverages  If you have not urinated within 8 hours after discharge, please contact your surgeon on call.    Report the following to your surgeon:  Excessive pain, swelling, redness or odor of or around the surgical area  Temperature over 100.5  Nausea and vomiting lasting longer than 4 hours or if unable to take medications  Any signs of decreased circulation or nerve impairment to extremity: change in color, persistent  numbness, tingling, coldness or increase pain  Any questions    What to do at Home:  Recommended activity: activity as tolerated.    If you experience any of the following symptoms, please call your doctor now or seek immediate medical care if:  You have symptoms that a kidney infection is getting worse. These may include:  Pain or burning when you urinate.  A frequent need to urinate without being able to pass much urine.  Pain in the flank, which is just below the rib cage and above the waist on either side of the back.  Blood in the urine.  A fever.   .    *  Please give a list of your current medications to your Primary Care Provider.    *  Please update this list whenever your medications are discontinued, doses are      changed, or new medications (including over-the-counter products) are added.    *  Please carry medication information at all times in case of emergency situations.    These are general instructions for a healthy lifestyle:    No smoking/ No tobacco products/ Avoid exposure to second hand smoke  Surgeon General's Warning:  Quitting smoking now greatly reduces serious risk to your health.    Obesity, smoking, and sedentary lifestyle greatly increases your risk for

## 2024-08-27 NOTE — DISCHARGE SUMMARY
Hospitalist Discharge Summary   Admit Date:  2024 12:52 AM   DC Note date: 2024  Name:  Tamar Navarro   Age:  34 y.o.  Sex:  female  :  1990   MRN:  [id number]   Room[facility]  PCP:  Catalina Guajardo APRN - NP    Presenting Complaint: No chief complaint on file.     Initial Admission Diagnosis: Acute pyelonephritis [N10]     Problem List for this Hospitalization (present on admission):    Principal Problem (Resolved):    Acute pyelonephritis  Active Problems:    Morbid obesity (HCC)    EMILIE (obstructive sleep apnea)    Normocytic anemia    Iron deficiency  Resolved Problems:    Sepsis (HCC)      Hospital Course:  Patient is a 33 y/o female with medical history of morbid obesity, EMILIE, recurrent UTI, chlamydia, bacterial vaginosis, normocytic anemia, recent admission - for E. Coli bacteremia and sepsis secondary to pyelonephritis who presented to ED with cc bilateral flank pain, nausea, vomiting, myalgias, dysuria, pelvic pain. She completed course of Ciprofloxacin as prescribed for recent infection (total 7 day course). Patient was tachycardic and with low grade fever to 100.5 on arrival. Labs notable for WBC 12.3 LA 1.1. Urinalysis infectious. CT imaging revealed persistent pyelonephritis, similar to prior, no hydronephrosis or obstruction. Patient met sepsis criteria with fever, tachycardia, leukocytosis; not severe, no evidence of organ dysfunction. She was initiated on Ceftriaxone IV with blood cultures obtained. She was admitted for further management.     Leukocytosis resolved, but she remained febrile so antibiotics were adjusted to Cefepime and then Zosyn. Urine culture finalized with pan-sensitive E. Coli. Blood cultures remain negative to date. Sepsis resolved: WBC normalized, fever resolved. She is voiding well, and renal function remains normal. Renal ultrasound obtained and remains negative for any stones. Transition to oral Cefadroxil based on urine sensitivities to complete  Collected: 08/22/24 2153    Order Status: Completed Specimen: Blood Updated: 08/27/24 0725     Special Requests --        NO SPECIAL REQUESTS  RIGHT  Antecubital       Culture NO GROWTH 4 DAYS       Culture, Urine [[id number]]  (Abnormal)  (Susceptibility) Collected: 08/22/24 1942    Order Status: Completed Specimen: Urine, clean catch Updated: 08/25/24 0838     Special Requests NO SPECIAL REQUESTS        Culture       >100,000 COLONIES/mL Escherichia coli                  <10,000 COLONIES/mL MIXED SKIN HARI ISOLATED          Susceptibility        Escherichia coli      BACTERIAL SUSCEPTIBILITY PANEL BRANDIE      ampicillin >=32 ug/mL Resistant      ampicillin-sulbactam 16 ug/mL Intermediate      ceFAZolin (systemic) 2 ug/mL Sensitive      ceFAZolin (urine) 2 ug/mL Sensitive      cefepime <=0.12 ug/mL Sensitive      cefTRIAXone <=0.25 ug/mL Sensitive      gentamicin <=1 ug/mL Sensitive      nitrofurantoin <=16 ug/mL Sensitive      piperacillin-tazobactam <=4 ug/mL Sensitive      trimethoprim-sulfamethoxazole >=320 ug/mL Resistant                                   All Labs from Last 24 Hrs:  Recent Results (from the past 24 hour(s))   Wet prep, genital    Collection Time: 08/26/24  7:15 PM    Specimen: Vaginal   Result Value Ref Range    Special Requests NO SPECIAL REQUESTS      Wet Prep NO YEAST,TRICHOMONAS OR CLUE CELLS NOTED      Wet Prep NO WBC'S SEEN     Ferritin    Collection Time: 08/27/24  3:17 AM   Result Value Ref Range    Ferritin 299 8 - 388 NG/ML   Iron and TIBC    Collection Time: 08/27/24  3:17 AM   Result Value Ref Range    Iron 34 (L) 35 - 100 ug/dL    TIBC 187 (L) 240 - 450 ug/dL    Iron % Saturation 18 (L) 20 - 50 %    UIBC 153.0 112.0 - 347.0 ug/dL       No results for input(s): \"COVID19\" in the last 72 hours.    Recent Vital Data:  Patient Vitals for the past 24 hrs:   Temp Pulse Resp BP SpO2   08/27/24 0707 97.9 °F (36.6 °C) 75 17 129/77 96 %   08/27/24 0324 98.4 °F (36.9 °C) 92 17 135/79 99 %

## 2024-08-27 NOTE — CASE COMMUNICATION
Patient is enrolled to B.  I have sent discharge Rx to [facility].    Signed: Michelle MCKINNON-BC

## 2024-08-27 NOTE — PROGRESS NOTES
Outpatient Pharmacy Progress Note for Meds-to-Beds    Total number of Prescriptions Filled: 3    List of Medications (name,strength):  Fluconazole 150 mg  Dicyclomine 20 mg  Cefadroxil 500 mg       Delivered to: Tamar Navarro [id number]      Co-pay: 0      Payment Type:0      Additional Documentation:  Medication(s) were delivered to the patient's room prior to discharge      Thank you for letting us serve your patients.  [facility] [id number]- [place] [address]  Phone: [phone]  Fax: [phone]

## 2024-08-27 NOTE — CARE COORDINATION
Patient discharging home today. CM gave patient medications of hope application, one of her meds were on [facility] formulary. CM voucher the 2 other medications. No further discharge needs at this time.    Vashti MATHIAS, M  [place]

## 2024-08-28 LAB
BACTERIA SPEC CULT: NORMAL
BACTERIA SPEC CULT: NORMAL
C TRACH RRNA SPEC QL NAA+PROBE: NEGATIVE
N GONORRHOEA RRNA SPEC QL NAA+PROBE: NEGATIVE
SERVICE CMNT-IMP: NORMAL
SERVICE CMNT-IMP: NORMAL
SPECIMEN SOURCE: NORMAL

## 2024-09-11 ENCOUNTER — APPOINTMENT (OUTPATIENT)
Dept: CT IMAGING | Age: 34
End: 2024-09-11
Payer: COMMERCIAL

## 2024-09-11 ENCOUNTER — HOSPITAL ENCOUNTER (EMERGENCY)
Age: 34
Discharge: HOME | End: 2024-09-11
Attending: EMERGENCY MEDICINE
Payer: COMMERCIAL

## 2024-09-11 VITALS
DIASTOLIC BLOOD PRESSURE: 79 MMHG | OXYGEN SATURATION: 97 % | WEIGHT: 293 LBS | SYSTOLIC BLOOD PRESSURE: 131 MMHG | BODY MASS INDEX: 51.91 KG/M2 | HEIGHT: 63 IN | RESPIRATION RATE: 17 BRPM | HEART RATE: 68 BPM | TEMPERATURE: 97.9 F

## 2024-09-11 DIAGNOSIS — S39.012A STRAIN OF LUMBAR REGION, INITIAL ENCOUNTER: Primary | ICD-10-CM

## 2024-09-11 DIAGNOSIS — N39.0 URINARY TRACT INFECTION WITHOUT HEMATURIA, SITE UNSPECIFIED: ICD-10-CM

## 2024-09-11 LAB
ALBUMIN SERPL-MCNC: 3.7 G/DL (ref 3.5–5)
ALBUMIN/GLOB SERPL: 1 (ref 0.4–1.6)
ALP SERPL-CCNC: 44 U/L (ref 45–117)
ALT SERPL-CCNC: 6 U/L (ref 13–61)
ANION GAP SERPL CALC-SCNC: 9 MMOL/L (ref 9–18)
APPEARANCE UR: CLEAR
AST SERPL-CCNC: 12 U/L (ref 15–37)
BACTERIA URNS QL MICRO: 0 /HPF
BASOPHILS # BLD: 0 K/UL (ref 0–0.2)
BASOPHILS NFR BLD: 0 % (ref 0–2)
BILIRUB SERPL-MCNC: 0.4 MG/DL (ref 0.2–1.1)
BILIRUB UR QL: NEGATIVE
BUN SERPL-MCNC: 12 MG/DL (ref 6–23)
CALCIUM SERPL-MCNC: 8.6 MG/DL (ref 8.3–10.4)
CHLORIDE SERPL-SCNC: 103 MMOL/L (ref 98–107)
CO2 SERPL-SCNC: 27 MMOL/L (ref 21–32)
COLOR UR: YELLOW
CREAT SERPL-MCNC: 0.79 MG/DL (ref 0.6–1)
DIFFERENTIAL METHOD BLD: ABNORMAL
EOSINOPHIL # BLD: 0.1 K/UL (ref 0–0.8)
EOSINOPHIL NFR BLD: 2 % (ref 0.5–7.8)
EPI CELLS #/AREA URNS HPF: 0 /HPF
ERYTHROCYTE [DISTWIDTH] IN BLOOD BY AUTOMATED COUNT: 16.4 % (ref 11.9–14.6)
GLOBULIN SER CALC-MCNC: 3.7 G/DL (ref 2.8–4.5)
GLUCOSE SERPL-MCNC: 103 MG/DL (ref 65–100)
GLUCOSE UR STRIP.AUTO-MCNC: NEGATIVE MG/DL
HCG UR QL: NEGATIVE
HCT VFR BLD AUTO: 32.2 % (ref 35.8–46.3)
HGB BLD-MCNC: 9.9 G/DL (ref 11.7–15.4)
HGB UR QL STRIP: ABNORMAL
IMM GRANULOCYTES # BLD AUTO: 0 K/UL (ref 0–0.5)
IMM GRANULOCYTES NFR BLD AUTO: 0 % (ref 0–5)
KETONES UR QL STRIP.AUTO: NEGATIVE MG/DL
LEUKOCYTE ESTERASE UR QL STRIP.AUTO: ABNORMAL
LYMPHOCYTES # BLD: 3.1 K/UL (ref 0.5–4.6)
LYMPHOCYTES NFR BLD: 43 % (ref 13–44)
MCH RBC QN AUTO: 27 PG (ref 26.1–32.9)
MCHC RBC AUTO-ENTMCNC: 30.7 G/DL (ref 31.4–35)
MCV RBC AUTO: 87.7 FL (ref 82–102)
MONOCYTES # BLD: 0.4 K/UL (ref 0.1–1.3)
MONOCYTES NFR BLD: 6 % (ref 4–12)
MUCOUS THREADS URNS QL MICRO: 0 /LPF
NEUTS SEG # BLD: 3.6 K/UL (ref 1.7–8.2)
NEUTS SEG NFR BLD: 50 % (ref 43–78)
NITRITE UR QL STRIP.AUTO: POSITIVE
NRBC # BLD: 0 K/UL (ref 0–0.2)
OTHER OBSERVATIONS: NORMAL
PH UR STRIP: 5.5 (ref 5–9)
PLATELET # BLD AUTO: 320 K/UL (ref 150–450)
PMV BLD AUTO: 9.8 FL (ref 9.4–12.3)
POTASSIUM SERPL-SCNC: 3.6 MMOL/L (ref 3.5–5.1)
PROT SERPL-MCNC: 7.4 G/DL (ref 6.4–8.2)
PROT UR STRIP-MCNC: NEGATIVE MG/DL
RBC # BLD AUTO: 3.67 M/UL (ref 4.05–5.2)
RBC #/AREA URNS HPF: NORMAL /HPF
SODIUM SERPL-SCNC: 139 MMOL/L (ref 133–143)
SP GR UR REFRACTOMETRY: 1.02 (ref 1–1.02)
UROBILINOGEN UR QL STRIP.AUTO: 0.2 EU/DL (ref 0.2–1)
WBC # BLD AUTO: 7.2 K/UL (ref 4.3–11.1)
WBC URNS QL MICRO: NORMAL /HPF

## 2024-09-11 PROCEDURE — 96375 TX/PRO/DX INJ NEW DRUG ADDON: CPT

## 2024-09-11 PROCEDURE — 81001 URINALYSIS AUTO W/SCOPE: CPT

## 2024-09-11 PROCEDURE — 80053 COMPREHEN METABOLIC PANEL: CPT

## 2024-09-11 PROCEDURE — 99284 EMERGENCY DEPT VISIT MOD MDM: CPT

## 2024-09-11 PROCEDURE — 2580000003 HC RX 258: Performed by: EMERGENCY MEDICINE

## 2024-09-11 PROCEDURE — 6360000002 HC RX W HCPCS: Performed by: EMERGENCY MEDICINE

## 2024-09-11 PROCEDURE — 96374 THER/PROPH/DIAG INJ IV PUSH: CPT

## 2024-09-11 PROCEDURE — 81025 URINE PREGNANCY TEST: CPT

## 2024-09-11 PROCEDURE — 85025 COMPLETE CBC W/AUTO DIFF WBC: CPT

## 2024-09-11 RX ORDER — KETOROLAC TROMETHAMINE 15 MG/ML
15 INJECTION, SOLUTION INTRAMUSCULAR; INTRAVENOUS ONCE
Status: COMPLETED | OUTPATIENT
Start: 2024-09-11 | End: 2024-09-11

## 2024-09-11 RX ORDER — KETOROLAC TROMETHAMINE 10 MG/1
10 TABLET, FILM COATED ORAL EVERY 6 HOURS PRN
Qty: 20 TABLET | Refills: 0 | Status: SHIPPED | OUTPATIENT
Start: 2024-09-11

## 2024-09-11 RX ORDER — HYDROCODONE BITARTRATE AND ACETAMINOPHEN 10; 325 MG/1; MG/1
1 TABLET ORAL EVERY 8 HOURS PRN
Qty: 20 TABLET | Refills: 0 | Status: SHIPPED | OUTPATIENT
Start: 2024-09-11 | End: 2024-10-11

## 2024-09-11 RX ORDER — LEVOFLOXACIN 500 MG/1
500 TABLET, FILM COATED ORAL DAILY
Qty: 10 TABLET | Refills: 0 | Status: SHIPPED | OUTPATIENT
Start: 2024-09-11 | End: 2024-09-21

## 2024-09-11 RX ORDER — MORPHINE SULFATE 4 MG/ML
4 INJECTION, SOLUTION INTRAMUSCULAR; INTRAVENOUS
Status: COMPLETED | OUTPATIENT
Start: 2024-09-11 | End: 2024-09-11

## 2024-09-11 RX ORDER — METHOCARBAMOL 750 MG/1
750 TABLET, FILM COATED ORAL 4 TIMES DAILY
Qty: 40 TABLET | Refills: 0 | Status: SHIPPED | OUTPATIENT
Start: 2024-09-11 | End: 2024-09-21

## 2024-09-11 RX ADMIN — KETOROLAC TROMETHAMINE 15 MG: 15 INJECTION, SOLUTION INTRAMUSCULAR; INTRAVENOUS at 13:30

## 2024-09-11 RX ADMIN — MORPHINE SULFATE 4 MG: 4 INJECTION, SOLUTION INTRAMUSCULAR; INTRAVENOUS at 13:30

## 2024-09-11 RX ADMIN — WATER 1000 MG: 1 INJECTION INTRAMUSCULAR; INTRAVENOUS; SUBCUTANEOUS at 14:01

## 2024-09-11 ASSESSMENT — ENCOUNTER SYMPTOMS
BOWEL INCONTINENCE: 0
BACK PAIN: 1
ABDOMINAL PAIN: 0

## 2024-09-11 ASSESSMENT — LIFESTYLE VARIABLES
HOW MANY STANDARD DRINKS CONTAINING ALCOHOL DO YOU HAVE ON A TYPICAL DAY: PATIENT DOES NOT DRINK
HOW OFTEN DO YOU HAVE A DRINK CONTAINING ALCOHOL: NEVER

## 2024-09-11 ASSESSMENT — PAIN SCALES - GENERAL: PAINLEVEL_OUTOF10: 10

## 2024-09-11 ASSESSMENT — PAIN DESCRIPTION - LOCATION: LOCATION: BACK

## 2024-09-11 ASSESSMENT — PAIN - FUNCTIONAL ASSESSMENT: PAIN_FUNCTIONAL_ASSESSMENT: 0-10

## 2024-11-24 ENCOUNTER — HOSPITAL ENCOUNTER (EMERGENCY)
Age: 34
Discharge: HOME | End: 2024-11-24
Payer: COMMERCIAL

## 2024-11-24 VITALS
SYSTOLIC BLOOD PRESSURE: 144 MMHG | DIASTOLIC BLOOD PRESSURE: 89 MMHG | WEIGHT: 293 LBS | OXYGEN SATURATION: 98 % | HEIGHT: 62 IN | HEART RATE: 84 BPM | RESPIRATION RATE: 16 BRPM | BODY MASS INDEX: 53.92 KG/M2 | TEMPERATURE: 97.5 F

## 2024-11-24 DIAGNOSIS — N30.01 ACUTE CYSTITIS WITH HEMATURIA: Primary | ICD-10-CM

## 2024-11-24 DIAGNOSIS — N76.0 BACTERIAL VAGINOSIS: ICD-10-CM

## 2024-11-24 DIAGNOSIS — B96.89 BACTERIAL VAGINOSIS: ICD-10-CM

## 2024-11-24 LAB
ANION GAP SERPL CALC-SCNC: 12 MMOL/L (ref 7–16)
APPEARANCE UR: CLEAR
BACTERIA URNS QL MICRO: ABNORMAL /HPF
BASOPHILS # BLD: 0.1 K/UL (ref 0–0.2)
BASOPHILS NFR BLD: 1 % (ref 0–2)
BILIRUB UR QL: NEGATIVE
BILIRUB UR QL: NEGATIVE
BUN SERPL-MCNC: 10 MG/DL (ref 6–23)
CALCIUM SERPL-MCNC: 8.5 MG/DL (ref 8.8–10.2)
CASTS URNS QL MICRO: 0 /LPF
CHLORIDE SERPL-SCNC: 104 MMOL/L (ref 98–107)
CO2 SERPL-SCNC: 24 MMOL/L (ref 20–29)
COLOR UR: ABNORMAL
CREAT SERPL-MCNC: 1.12 MG/DL (ref 0.6–1.1)
CRYSTALS URNS QL MICRO: 0 /LPF
DIFFERENTIAL METHOD BLD: ABNORMAL
EOSINOPHIL # BLD: 0.2 K/UL (ref 0–0.8)
EOSINOPHIL NFR BLD: 2 % (ref 0.5–7.8)
EPI CELLS #/AREA URNS HPF: ABNORMAL /HPF
ERYTHROCYTE [DISTWIDTH] IN BLOOD BY AUTOMATED COUNT: 17.8 % (ref 11.9–14.6)
GLUCOSE SERPL-MCNC: 95 MG/DL (ref 70–99)
GLUCOSE UR QL STRIP.AUTO: NEGATIVE MG/DL
GLUCOSE UR STRIP.AUTO-MCNC: NEGATIVE MG/DL
HCG UR QL: NEGATIVE
HCT VFR BLD AUTO: 32.7 % (ref 35.8–46.3)
HGB BLD-MCNC: 11.8 G/DL (ref 11.7–15.4)
HGB UR QL STRIP: NEGATIVE
IMM GRANULOCYTES # BLD AUTO: 0 K/UL (ref 0–0.5)
IMM GRANULOCYTES NFR BLD AUTO: 0 % (ref 0–5)
KETONES UR QL STRIP.AUTO: NEGATIVE MG/DL
KETONES UR-MCNC: NEGATIVE MG/DL
LACTATE SERPL-SCNC: 1.4 MMOL/L (ref 0.5–2)
LEUKOCYTE ESTERASE UR QL STRIP.AUTO: ABNORMAL
LEUKOCYTE ESTERASE UR QL STRIP: ABNORMAL
LYMPHOCYTES # BLD: 3.3 K/UL (ref 0.5–4.6)
LYMPHOCYTES NFR BLD: 36 % (ref 13–44)
MCH RBC QN AUTO: 26.9 PG (ref 26.1–32.9)
MCHC RBC AUTO-ENTMCNC: 36.1 G/DL (ref 31.4–35)
MCV RBC AUTO: 74.7 FL (ref 82–102)
MONOCYTES # BLD: 0.4 K/UL (ref 0.1–1.3)
MONOCYTES NFR BLD: 5 % (ref 4–12)
MUCOUS THREADS URNS QL MICRO: 0 /LPF
NEUTS SEG # BLD: 5.2 K/UL (ref 1.7–8.2)
NEUTS SEG NFR BLD: 56 % (ref 43–78)
NITRITE UR QL STRIP.AUTO: NEGATIVE
NITRITE UR QL: NEGATIVE
NRBC # BLD: 0 K/UL (ref 0–0.2)
OTHER OBSERVATIONS: ABNORMAL
PH UR STRIP: 8.5 (ref 5–9)
PH UR: 8.5 (ref 5–9)
PLATELET # BLD AUTO: 388 K/UL (ref 150–450)
PMV BLD AUTO: 8.2 FL (ref 9.4–12.3)
POTASSIUM SERPL-SCNC: 4.2 MMOL/L (ref 3.5–5.1)
PROT UR QL: 30 MG/DL
PROT UR STRIP-MCNC: 30 MG/DL
RBC # BLD AUTO: 4.38 M/UL (ref 4.05–5.2)
RBC # UR STRIP: ABNORMAL
RBC #/AREA URNS HPF: ABNORMAL /HPF
SERVICE CMNT-IMP: ABNORMAL
SERVICE CMNT-IMP: NORMAL
SODIUM SERPL-SCNC: 139 MMOL/L (ref 136–145)
SP GR UR REFRACTOMETRY: 1.02 (ref 1–1.02)
SP GR UR: 1.02 (ref 1–1.02)
URINE CULTURE IF INDICATED: ABNORMAL
UROBILINOGEN UR QL STRIP.AUTO: 1 EU/DL (ref 0.2–1)
UROBILINOGEN UR QL: 2 EU/DL (ref 0.2–1)
WBC # BLD AUTO: 9.2 K/UL (ref 4.3–11.1)
WBC URNS QL MICRO: ABNORMAL /HPF
WET PREP GENITAL: NORMAL

## 2024-11-24 PROCEDURE — 80048 BASIC METABOLIC PNL TOTAL CA: CPT

## 2024-11-24 PROCEDURE — 85025 COMPLETE CBC W/AUTO DIFF WBC: CPT

## 2024-11-24 PROCEDURE — 87591 N.GONORRHOEAE DNA AMP PROB: CPT

## 2024-11-24 PROCEDURE — 87491 CHLMYD TRACH DNA AMP PROBE: CPT

## 2024-11-24 PROCEDURE — 87210 SMEAR WET MOUNT SALINE/INK: CPT

## 2024-11-24 PROCEDURE — 81003 URINALYSIS AUTO W/O SCOPE: CPT

## 2024-11-24 PROCEDURE — 81025 URINE PREGNANCY TEST: CPT

## 2024-11-24 PROCEDURE — 87088 URINE BACTERIA CULTURE: CPT

## 2024-11-24 PROCEDURE — 81001 URINALYSIS AUTO W/SCOPE: CPT

## 2024-11-24 PROCEDURE — 87086 URINE CULTURE/COLONY COUNT: CPT

## 2024-11-24 PROCEDURE — 83605 ASSAY OF LACTIC ACID: CPT

## 2024-11-24 PROCEDURE — 87186 SC STD MICRODIL/AGAR DIL: CPT

## 2024-11-24 PROCEDURE — 99283 EMERGENCY DEPT VISIT LOW MDM: CPT

## 2024-11-24 RX ORDER — METRONIDAZOLE 500 MG/1
500 TABLET ORAL 2 TIMES DAILY
Qty: 14 TABLET | Refills: 0 | Status: SHIPPED | OUTPATIENT
Start: 2024-11-24 | End: 2024-12-01

## 2024-11-24 RX ORDER — CEFPODOXIME PROXETIL 100 MG/1
100 TABLET, FILM COATED ORAL 2 TIMES DAILY
Qty: 20 TABLET | Refills: 0 | Status: SHIPPED | OUTPATIENT
Start: 2024-11-24 | End: 2024-12-04

## 2024-11-24 ASSESSMENT — LIFESTYLE VARIABLES
HOW MANY STANDARD DRINKS CONTAINING ALCOHOL DO YOU HAVE ON A TYPICAL DAY: 1 OR 2
HOW OFTEN DO YOU HAVE A DRINK CONTAINING ALCOHOL: MONTHLY OR LESS

## 2024-11-24 NOTE — ED PROVIDER NOTES
Lymphocytes Absolute 3.3 0.5 - 4.6 K/UL    Monocytes Absolute 0.4 0.1 - 1.3 K/UL    Eosinophils Absolute 0.2 0.0 - 0.8 K/UL    Basophils Absolute 0.1 0.0 - 0.2 K/UL    Immature Granulocytes Absolute 0.0 0.0 - 0.5 K/UL   BMP   Result Value Ref Range    Sodium 139 136 - 145 mmol/L    Potassium 4.2 3.5 - 5.1 mmol/L    Chloride 104 98 - 107 mmol/L    CO2 24 20 - 29 mmol/L    Anion Gap 12 7 - 16 mmol/L    Glucose 95 70 - 99 mg/dL    BUN 10 6 - 23 MG/DL    Creatinine 1.12 (H) 0.60 - 1.10 MG/DL    Est, Glom Filt Rate 66 >60 ml/min/1.73m2    Calcium 8.5 (L) 8.8 - 10.2 MG/DL   Lactate, Sepsis   Result Value Ref Range    Lactic Acid, Sepsis 1.4 0.5 - 2.0 MMOL/L   Urinalysis with Reflex to Culture    Specimen: Urine   Result Value Ref Range    Color, UA YELLOW/STRAW      Appearance CLEAR      Specific Gravity, UA 1.022 1.001 - 1.023      pH, Urine 8.5 5.0 - 9.0      Protein, UA 30 (A) NEG mg/dL    Glucose, Ur Negative NEG mg/dL    Ketones, Urine Negative NEG mg/dL    Bilirubin, Urine Negative NEG      Blood, Urine Negative NEG      Urobilinogen, Urine 1.0 0.2 - 1.0 EU/dL    Nitrite, Urine Negative NEG      Leukocyte Esterase, Urine MODERATE (A) NEG      WBC, UA 10-20 0 /hpf    RBC, UA 0-3 0 /hpf    BACTERIA, URINE 2+ (H) 0 /hpf    Urine Culture if Indicated URINE CULTURE ORDERED      Epithelial Cells, UA 0-3 0 /hpf    Casts 0 0 /lpf    Crystals 0 0 /LPF    Mucus, UA 0 0 /lpf    Other observations RESULTS VERIFIED MANUALLY     POC Pregnancy Urine Qual   Result Value Ref Range    Preg Test, Ur Negative NEG     POCT Urinalysis no Micro   Result Value Ref Range    Specific Gravity, Urine, POC 1.020 1.001 - 1.023      pH, Urine, POC 8.5 5.0 - 9.0      Protein, Urine, POC 30 (A) NEG mg/dL    Glucose, UA POC Negative NEG mg/dL    Ketones, Urine, POC Negative NEG mg/dL    Bilirubin, Urine, POC Negative NEG      Blood, UA POC Trace Intact (A) NEG      URINE UROBILINOGEN POC 2.0 (H) 0.2 - 1.0 EU/dL    Nitrite, Urine, POC Negative NEG

## 2024-11-24 NOTE — DISCHARGE INSTRUCTIONS
As we discussed, your workup did not reveal any emergent process.  Your labs here today indicate a urinary tract infection as well as bacterial vaginosis.  Have started you on 2 antibiotics for this.  I do want you to follow-up with your primary care physician in a couple weeks to have your symptoms recheck and receive further recommendations.  As we discussed, please return to the emergency department for any new, worsening, concerning symptoms.

## 2024-11-24 NOTE — ED NOTES
Patient mobility status  with no difficulty.     I have reviewed discharge instructions with the patient.  The patient verbalized understanding.    Patient left ED via Discharge Method: ambulatory to Home with  self .    Opportunity for questions and clarification provided.     Patient given 2 scripts.          Faith Gutierrez RN  11/24/24 9508

## 2024-11-24 NOTE — ED TRIAGE NOTES
Patient arrives ambulatory to triage c/o urinary frequency.   Reports recurrent UTIs with Hx sepsis. States she feels similar to this.    Denies fevers,vomiting

## 2024-11-26 LAB
BACTERIA SPEC CULT: ABNORMAL
BACTERIA SPEC CULT: ABNORMAL
C TRACH RRNA SPEC QL NAA+PROBE: NEGATIVE
N GONORRHOEA RRNA SPEC QL NAA+PROBE: NEGATIVE
SERVICE CMNT-IMP: ABNORMAL
SPECIMEN SOURCE: NORMAL

## 2024-11-26 NOTE — PROGRESS NOTES
ED pharmacist reviewed recent results of urine culture.    The patient received a prescription for cefpodoxime upon discharge. Based on culture results, the patient is being adequately treated for the identified infection with existing antimicrobial therapy. No further intervention needed.    Allergies as of 11/24/2024    (No Known Allergies)     mEmie Seaman, PharmD.  Emergency Medicine Clinical Pharmacist

## 2025-08-06 ENCOUNTER — HOSPITAL ENCOUNTER (EMERGENCY)
Age: 35
Discharge: HOME | End: 2025-08-06
Attending: EMERGENCY MEDICINE
Payer: COMMERCIAL

## 2025-08-06 VITALS
OXYGEN SATURATION: 100 % | HEIGHT: 62 IN | RESPIRATION RATE: 15 BRPM | SYSTOLIC BLOOD PRESSURE: 140 MMHG | HEART RATE: 87 BPM | TEMPERATURE: 97.4 F | WEIGHT: 293 LBS | DIASTOLIC BLOOD PRESSURE: 84 MMHG | BODY MASS INDEX: 53.92 KG/M2

## 2025-08-06 DIAGNOSIS — B96.89 BACTERIAL VAGINOSIS: ICD-10-CM

## 2025-08-06 DIAGNOSIS — N76.0 BACTERIAL VAGINOSIS: ICD-10-CM

## 2025-08-06 DIAGNOSIS — T19.2XXA FOREIGN BODY IN VAGINA, INITIAL ENCOUNTER: Primary | ICD-10-CM

## 2025-08-06 LAB
APPEARANCE UR: CLEAR
BACTERIA URNS QL MICRO: ABNORMAL /HPF
BILIRUB UR QL: NEGATIVE
COLOR UR: YELLOW
EPI CELLS #/AREA URNS HPF: ABNORMAL /HPF
GLUCOSE UR STRIP.AUTO-MCNC: NEGATIVE MG/DL
HCG UR QL: NEGATIVE
HGB UR QL STRIP: ABNORMAL
KETONES UR QL STRIP.AUTO: NEGATIVE MG/DL
LEUKOCYTE ESTERASE UR QL STRIP.AUTO: NEGATIVE
MUCOUS THREADS URNS QL MICRO: ABNORMAL /LPF
NITRITE UR QL STRIP.AUTO: POSITIVE
OTHER OBSERVATIONS: ABNORMAL
PH UR STRIP: 5.5 (ref 5–9)
PROT UR STRIP-MCNC: NEGATIVE MG/DL
RBC #/AREA URNS HPF: ABNORMAL /HPF
SERVICE CMNT-IMP: NORMAL
SP GR UR REFRACTOMETRY: 1.02 (ref 1–1.02)
URINE CULTURE IF INDICATED: ABNORMAL
UROBILINOGEN UR QL STRIP.AUTO: 0.2 EU/DL (ref 0.2–1)
WBC URNS QL MICRO: ABNORMAL /HPF
WET PREP GENITAL: NORMAL

## 2025-08-06 PROCEDURE — 87491 CHLMYD TRACH DNA AMP PROBE: CPT

## 2025-08-06 PROCEDURE — 81025 URINE PREGNANCY TEST: CPT

## 2025-08-06 PROCEDURE — 81001 URINALYSIS AUTO W/SCOPE: CPT

## 2025-08-06 PROCEDURE — 87591 N.GONORRHOEAE DNA AMP PROB: CPT

## 2025-08-06 PROCEDURE — 87210 SMEAR WET MOUNT SALINE/INK: CPT

## 2025-08-06 PROCEDURE — 99283 EMERGENCY DEPT VISIT LOW MDM: CPT

## 2025-08-06 PROCEDURE — 87086 URINE CULTURE/COLONY COUNT: CPT

## 2025-08-06 RX ORDER — METRONIDAZOLE 500 MG/1
500 TABLET ORAL 2 TIMES DAILY
Qty: 14 TABLET | Refills: 0 | Status: SHIPPED | OUTPATIENT
Start: 2025-08-06 | End: 2025-08-13

## 2025-08-06 ASSESSMENT — PAIN - FUNCTIONAL ASSESSMENT
PAIN_FUNCTIONAL_ASSESSMENT: NONE - DENIES PAIN
PAIN_FUNCTIONAL_ASSESSMENT: NONE - DENIES PAIN

## 2025-08-06 ASSESSMENT — LIFESTYLE VARIABLES
HOW OFTEN DO YOU HAVE A DRINK CONTAINING ALCOHOL: NEVER
HOW MANY STANDARD DRINKS CONTAINING ALCOHOL DO YOU HAVE ON A TYPICAL DAY: PATIENT DOES NOT DRINK

## 2025-08-06 ASSESSMENT — ENCOUNTER SYMPTOMS: ABDOMINAL PAIN: 0

## 2025-08-08 LAB
BACTERIA SPEC CULT: NORMAL
C TRACH RRNA SPEC QL NAA+PROBE: NEGATIVE
N GONORRHOEA RRNA SPEC QL NAA+PROBE: NEGATIVE
SERVICE CMNT-IMP: NORMAL
SPECIMEN SOURCE: NORMAL